# Patient Record
Sex: FEMALE | Race: WHITE | NOT HISPANIC OR LATINO | Employment: FULL TIME | URBAN - METROPOLITAN AREA
[De-identification: names, ages, dates, MRNs, and addresses within clinical notes are randomized per-mention and may not be internally consistent; named-entity substitution may affect disease eponyms.]

---

## 2017-03-03 PROBLEM — C44.90 SKIN CANCER: Status: ACTIVE | Noted: 2017-03-03

## 2017-12-12 ENCOUNTER — OFFICE VISIT (OUTPATIENT)
Dept: DERMATOLOGY | Facility: CLINIC | Age: 37
End: 2017-12-12
Payer: COMMERCIAL

## 2017-12-12 DIAGNOSIS — D22.9 MULTIPLE BENIGN NEVI: Primary | ICD-10-CM

## 2017-12-12 DIAGNOSIS — Z85.820 PERSONAL HISTORY OF MALIGNANT MELANOMA OF SKIN: ICD-10-CM

## 2017-12-12 DIAGNOSIS — Z85.828 PERSONAL HISTORY OF MALIGNANT NEOPLASM OF SKIN: ICD-10-CM

## 2017-12-12 DIAGNOSIS — L82.1 SEBORRHEIC KERATOSES: ICD-10-CM

## 2017-12-12 DIAGNOSIS — D18.00 ANGIOMA: ICD-10-CM

## 2017-12-12 DIAGNOSIS — L81.4 LENTIGINES: ICD-10-CM

## 2017-12-12 PROCEDURE — 99999 PR PBB SHADOW E&M-EST. PATIENT-LVL II: CPT | Mod: PBBFAC,,, | Performed by: DERMATOLOGY

## 2017-12-12 PROCEDURE — 99203 OFFICE O/P NEW LOW 30 MIN: CPT | Mod: S$GLB,,, | Performed by: DERMATOLOGY

## 2017-12-12 RX ORDER — GABAPENTIN 100 MG/1
CAPSULE ORAL
Refills: 1 | COMMUNITY
Start: 2017-11-16 | End: 2022-07-19

## 2017-12-12 NOTE — PROGRESS NOTES
Subjective:       Patient ID:  Tita Campos is a 37 y.o. female who presents for   Chief Complaint   Patient presents with    Skin Check     tbse     HPI  38 yo F presents for skin check.  She was due for a skin check last December but got busy with moving back home to [facility].  She has many spots on her body but they have remained stable in size, no bleeding.  She has had many biopsies in the past.   Derm Hx: multiple BCCs (4); melanoma of L shin; denies tanning bed use  Family Hx: father  due to melanoma  Past Medical History:   Diagnosis Date    Basal cell carcinoma     Cancer     skin cancer , basal cell CA     Cancer     cervical cancer    Melanoma      Review of Systems   Skin: Positive for daily sunscreen use and activity-related sunscreen use. Negative for recent sunburn.   Hematologic/Lymphatic: Does not bruise/bleed easily.        Objective:    Physical Exam   Constitutional: She appears well-developed and well-nourished. No distress.   Neurological: She is alert and oriented to person, place, and time. She is not disoriented.   Psychiatric: She has a normal mood and affect.   Skin:   Areas Examined (abnormalities noted in diagram):   Scalp / Hair Palpated and Inspected  Head / Face Inspection Performed  Neck Inspection Performed  Chest / Axilla Inspection Performed  Abdomen Inspection Performed  Genitals / Buttocks / Groin Inspection Performed  Back Inspection Performed  RUE Inspected  LUE Inspection Performed  RLE Inspected  LLE Inspection Performed  Nails and Digits Inspection Performed                   Diagram Legend     Erythematous scaling macule/papule c/w actinic keratosis       Vascular papule c/w angioma      Pigmented verrucoid papule/plaque c/w seborrheic keratosis      Yellow umbilicated papule c/w sebaceous hyperplasia      Irregularly shaped tan macule c/w lentigo     1-2 mm smooth white papules consistent with Milia      Movable subcutaneous cyst with punctum c/w  epidermal inclusion cyst      Subcutaneous movable cyst c/w pilar cyst      Firm pink to brown papule c/w dermatofibroma      Pedunculated fleshy papule(s) c/w skin tag(s)      Evenly pigmented macule c/w junctional nevus     Mildly variegated pigmented, slightly irregular-bordered macule c/w mildly atypical nevus      Flesh colored to evenly pigmented papule c/w intradermal nevus       Pink pearly papule/plaque c/w basal cell carcinoma      Erythematous hyperkeratotic cursted plaque c/w SCC      Surgical scar with no sign of skin cancer recurrence      Open and closed comedones      Inflammatory papules and pustules      Verrucoid papule consistent consistent with wart     Erythematous eczematous patches and plaques     Dystrophic onycholytic nail with subungual debris c/w onychomycosis     Umbilicated papule    Erythematous-base heme-crusted tan verrucoid plaque consistent with inflamed seborrheic keratosis     Erythematous Silvery Scaling Plaque c/w Psoriasis     See annotation      Assessment / Plan:        Multiple benign nevi  Reassurance that her nevi appear benign with regular and consistent pigment pattern on dermoscopy    Seborrheic keratoses  These are benign inherited growths without a malignant potential. Reassurance given to patient. No treatment is necessary.     Angioma  This is a benign vascular lesion. Reassurance given. No treatment required.     Lentigines  These are benign hyperpigmented sun induced lesions. Daily sun protection will reduce the number of new lesions    Personal history of malignant neoplasm of skin/Personal history of malignant melanoma of skin  Area of previous melanoma and NMSCs examined. Sites well healed with no signs of recurrence.  Total body skin examination performed today including at least 12 points as noted in physical examination. No lesions suspicious for malignancy noted.           Return in about 6 months (around 6/12/2018).

## 2018-03-06 ENCOUNTER — CLINICAL SUPPORT (OUTPATIENT)
Dept: SMOKING CESSATION | Facility: CLINIC | Age: 38
End: 2018-03-06
Payer: COMMERCIAL

## 2018-03-06 DIAGNOSIS — F17.200 NICOTINE DEPENDENCE: Primary | ICD-10-CM

## 2018-03-06 PROCEDURE — 99404 PREV MED CNSL INDIV APPRX 60: CPT | Mod: S$GLB,,, | Performed by: FAMILY MEDICINE

## 2018-03-06 RX ORDER — IBUPROFEN 200 MG
1 TABLET ORAL DAILY
Qty: 14 PATCH | Refills: 0 | Status: SHIPPED | OUTPATIENT
Start: 2018-03-06 | End: 2018-05-31 | Stop reason: SDUPTHER

## 2018-03-06 RX ORDER — VARENICLINE TARTRATE 0.5 (11)-1
KIT ORAL
Qty: 1 PACKAGE | Refills: 0 | Status: SHIPPED | OUTPATIENT
Start: 2018-03-06 | End: 2018-04-05 | Stop reason: ALTCHOICE

## 2018-03-06 NOTE — Clinical Note
Pt seen at intake today. She currently smokes 30 cigs/day. Discussed tobacco cessation medication of chantix starter pack and 21 mg nicotine patch QD. Pt started on rate reduction and wait time of 15 min prior to smoking. Exhaled carbon monoxide level was 27 (0-6 non-smoker). Will see pt back in office in 1 wk.

## 2018-03-15 ENCOUNTER — CLINICAL SUPPORT (OUTPATIENT)
Dept: SMOKING CESSATION | Facility: CLINIC | Age: 38
End: 2018-03-15
Payer: COMMERCIAL

## 2018-03-15 DIAGNOSIS — F17.200 NICOTINE DEPENDENCE: ICD-10-CM

## 2018-03-15 PROCEDURE — 99402 PREV MED CNSL INDIV APPRX 30: CPT | Mod: S$GLB,,, | Performed by: FAMILY MEDICINE

## 2018-03-15 NOTE — PROGRESS NOTES
Individual Follow-Up Form    3/15/2018    Clinical Status of Patient: Outpatient    Length of Service: 30 minutes    Continuing Medication: yes  Chantix or Patches    Other Medications: none     Target Symptoms: Withdrawal and medication side effects. The following were rated moderate (3) to severe (4) on TCRS:  · Moderate (3): desire/crave tobacco  · Severe (4): none    Comments:  Pt seen in office today. She continues to smoke 7 cigs/day. Pt remains on tobacco cessation medication of chantix 1 mg BID and 21 mg nicotine patch QD. She states that cravings are down. Doing well with rate reduction. No adverse effects/mental changes noted at this time. Pt asked to reduce current smoking rate by 2 cigs/day. Reviewed coping strategies/habitual behavior with patient. Exhaled carbon monoxide level was 13 ppm per Smokerlyzer (0-6 non-smoker). Will see pt back in office in 1 wk.     Diagnosis: F17.200    Next Visit: 1 week

## 2018-03-15 NOTE — Clinical Note
Pt seen in office today. She continues to smoke 7 cigs/day. Pt remains on tobacco cessation medication of chantix 1 mg BID and 21 mg nicotine patch QD. She states that cravings are down. Doing well with rate reduction. No adverse effects/mental changes noted at this time. Pt asked to reduce current smoking rate by 2 cigs/day. Reviewed coping strategies/habitual behavior with patient. Exhaled carbon monoxide level was 13 ppm per Smokerlyzer (0-6 non-smoker). Will see pt back in office in 1 wk.

## 2018-03-19 DIAGNOSIS — F17.200 NICOTINE DEPENDENCE: ICD-10-CM

## 2018-03-19 RX ORDER — IBUPROFEN 200 MG
1 TABLET ORAL DAILY
Qty: 14 PATCH | Refills: 0 | Status: CANCELLED | OUTPATIENT
Start: 2018-03-19 | End: 2018-04-02

## 2018-03-22 ENCOUNTER — CLINICAL SUPPORT (OUTPATIENT)
Dept: SMOKING CESSATION | Facility: CLINIC | Age: 38
End: 2018-03-22
Payer: COMMERCIAL

## 2018-03-22 DIAGNOSIS — F17.200 NICOTINE DEPENDENCE: ICD-10-CM

## 2018-03-22 PROCEDURE — 99402 PREV MED CNSL INDIV APPRX 30: CPT | Mod: S$GLB,,, | Performed by: FAMILY MEDICINE

## 2018-03-22 NOTE — PROGRESS NOTES
Individual Follow-Up Form    3/22/2018    Clinical Status of Patient: Outpatient    Length of Service: 30 minutes    Continuing Medication: yes  Chantix    Other Medications: none     Target Symptoms: Withdrawal and medication side effects. The following were rated moderate (3) to severe (4) on TCRS:  · Moderate (3): none  · Severe (4): none    Comments:  Pt seen in office today. She continues to smoke 15 cigs/day. Pt remains on tobacco cessation medication of chantix 1 mg BID. No adverse effects/mental changes noted at this time. Pt asked to reduce current smoking rate by 3 cigs/day. Reviewed coping strategies/habitual behavior/relapse prevention with patient. Exhaled carbon monoxide level was 21 ppm per Smokerlyzer (0-6 non-smoker). She states that she smoked prior to visit. Will see pt back in office in 1 wk.     Diagnosis: F17.200    Next Visit: 1 week

## 2018-03-22 NOTE — Clinical Note
Pt seen in office today. She continues to smoke 15 cigs/day. Pt remains on tobacco cessation medication of chantix 1 mg BID. No adverse effects/mental changes noted at this time. Pt asked to reduce current smoking rate by 3 cigs/day. Reviewed coping strategies/habitual behavior/relapse prevention with patient. Exhaled carbon monoxide level was 21 ppm per Smokerlyzer (0-6 non-smoker). She states that she smoked prior to visit. Will see pt back in office in 1 wk.

## 2018-03-29 ENCOUNTER — CLINICAL SUPPORT (OUTPATIENT)
Dept: SMOKING CESSATION | Facility: CLINIC | Age: 38
End: 2018-03-29
Payer: COMMERCIAL

## 2018-03-29 DIAGNOSIS — F17.200 NICOTINE DEPENDENCE: Primary | ICD-10-CM

## 2018-03-29 PROCEDURE — 99402 PREV MED CNSL INDIV APPRX 30: CPT | Mod: S$GLB,,, | Performed by: FAMILY MEDICINE

## 2018-03-29 RX ORDER — IBUPROFEN 200 MG
1 TABLET ORAL DAILY
Qty: 28 PATCH | Refills: 0 | Status: SHIPPED | OUTPATIENT
Start: 2018-03-29 | End: 2018-04-23 | Stop reason: SDUPTHER

## 2018-03-29 NOTE — PROGRESS NOTES
Individual Follow-Up Form    3/29/2018    Clinical Status of Patient: Outpatient    Length of Service: 30 minutes    Continuing Medication: yes  Chantix    Other Medications: none     Target Symptoms: Withdrawal and medication side effects. The following were rated moderate (3) to severe (4) on TCRS:  · Moderate (3): desire/crave tobacco  · Severe (4): none    Comments:  Pt seen in office today. She continues to smoke 12 cigs/day. Pt remains on tobacco cessation medication of chantix starter pack. Added 21 mg nicotine patch QD to help with cravings. No adverse effects/mental changes noted at this time. Pt asked to reduce current smoking rate by 3 cigs/day. Reviewed coping strategies/habitual behavior with patient. Exhaled carbon monoxide level was 14 ppm per Smokerlyzer (0-6 non-smoker). Will see pt back in office in 1 wk.     Diagnosis: F17.200    Next Visit: 1 week

## 2018-03-29 NOTE — Clinical Note
Pt seen in office today. She continues to smoke 12 cigs/day. Pt remains on tobacco cessation medication of chantix starter pack. Added 21 mg nicotine patch QD to help with cravings. No adverse effects/mental changes noted at this time. Pt asked to reduce current smoking rate by 3 cigs/day. Reviewed coping strategies/habitual behavior with patient. Exhaled carbon monoxide level was 14 ppm per Smokerlyzer (0-6 non-smoker). Will see pt back in office in 1 wk.

## 2018-04-05 DIAGNOSIS — F17.200 NICOTINE DEPENDENCE: Primary | ICD-10-CM

## 2018-04-05 RX ORDER — VARENICLINE TARTRATE 1 MG/1
1 TABLET, FILM COATED ORAL 2 TIMES DAILY
Qty: 60 TABLET | Refills: 0 | Status: SHIPPED | OUTPATIENT
Start: 2018-04-05 | End: 2018-05-10 | Stop reason: SDUPTHER

## 2018-04-05 NOTE — PROGRESS NOTES
Pt called to say she is sick and could not make appointment today. She wanted to re-schedule, but no openings at the time she requests. She will wait until next appointment on Thursday. Re-ordered chantix at this time.

## 2018-04-12 ENCOUNTER — CLINICAL SUPPORT (OUTPATIENT)
Dept: SMOKING CESSATION | Facility: CLINIC | Age: 38
End: 2018-04-12
Payer: COMMERCIAL

## 2018-04-12 DIAGNOSIS — F17.200 NICOTINE DEPENDENCE: ICD-10-CM

## 2018-04-12 PROCEDURE — 99402 PREV MED CNSL INDIV APPRX 30: CPT | Mod: S$GLB,,, | Performed by: FAMILY MEDICINE

## 2018-04-12 NOTE — PROGRESS NOTES
Individual Follow-Up Form    4/12/2018    Clinical Status of Patient: Outpatient    Length of Service: 30 minutes    Continuing Medication: yes  Chantix or Patches    Other Medications: none     Target Symptoms: Withdrawal and medication side effects. The following were rated moderate (3) to severe (4) on TCRS:  · Moderate (3): desire/crave tobacco  · Severe (4): none    Comments:  Pt seen in office today. She continues to smoke 9 cigs/day. Pt remains on tobacco cessation medication of chantix 1 mg BID and 21 mg nicotine patch QD. No adverse effects/mental changes noted at this time. Pt asked to reduce current smoking rate by 3 cigs/day. Reviewed coping strategies/habitual behavior/relapse prevention with patient. Exhaled carbon monoxide level was 4 ppm per Smokerlyzer (0-6 non-smoker). Will see pt back in office in 1 wk.     Diagnosis: F17.200    Next Visit: 1 week

## 2018-04-12 NOTE — Clinical Note
Pt seen in office today. She continues to smoke 9 cigs/day. Pt remains on tobacco cessation medication of chantix 1 mg BID and 21 mg nicotine patch QD. No adverse effects/mental changes noted at this time. Pt asked to reduce current smoking rate by 3 cigs/day. Reviewed coping strategies/habitual behavior/relapse prevention with patient. Exhaled carbon monoxide level was 4 ppm per Smokerlyzer (0-6 non-smoker). Will see pt back in office in 1 wk.

## 2018-04-19 ENCOUNTER — CLINICAL SUPPORT (OUTPATIENT)
Dept: SMOKING CESSATION | Facility: CLINIC | Age: 38
End: 2018-04-19
Payer: COMMERCIAL

## 2018-04-19 DIAGNOSIS — F17.200 NICOTINE DEPENDENCE: ICD-10-CM

## 2018-04-19 PROCEDURE — 99402 PREV MED CNSL INDIV APPRX 30: CPT | Mod: S$GLB,,, | Performed by: FAMILY MEDICINE

## 2018-04-19 RX ORDER — IBUPROFEN 200 MG
1 TABLET ORAL DAILY
Qty: 28 PATCH | Refills: 0 | Status: CANCELLED | OUTPATIENT
Start: 2018-04-19 | End: 2018-05-19

## 2018-04-19 NOTE — PROGRESS NOTES
Individual Follow-Up Form    4/19/2018    Clinical Status of Patient: Outpatient    Length of Service: 30 minutes    Continuing Medication: yes  Chantix or Patches    Other Medications: none     Target Symptoms: Withdrawal and medication side effects. The following were rated moderate (3) to severe (4) on TCRS:  · Moderate (3): desire/crave tobacco  · Severe (4): none    Comments: Pt seen in office today. She continues to smoke 3-4 cigs/day. Pt remains on tobacco cessation medication of chantix 1 mg BID and 21 mg nicotine patch QD. No adverse effects/mental changes noted at this time. Pt asked her to make this her last pack and attempt a quit prior to next visit. Reviewed coping strategies/habitual behavior/relapse prevention with patient. Exhaled carbon monoxide level was 4 ppm per Smokerlyzer (0-6 non-smoker). Will see pt back in office in 1 wk.     Diagnosis: F17.200    Next Visit: 1 week

## 2018-04-19 NOTE — Clinical Note
Pt seen in office today. She continues to smoke 3-4 cigs/day. Pt remains on tobacco cessation medication of chantix 1 mg BID and 21 mg nicotine patch QD. No adverse effects/mental changes noted at this time. Pt asked her to make this her last pack and attempt a quit prior to next visit. Reviewed coping strategies/habitual behavior/relapse prevention with patient. Exhaled carbon monoxide level was 4 ppm per Smokerlyzer (0-6 non-smoker). Will see pt back in office in 1 wk.

## 2018-04-23 DIAGNOSIS — G44.001 INTRACTABLE CLUSTER HEADACHE SYNDROME, UNSPECIFIED CHRONICITY PATTERN: ICD-10-CM

## 2018-04-23 DIAGNOSIS — F17.200 NICOTINE DEPENDENCE: ICD-10-CM

## 2018-04-23 RX ORDER — IBUPROFEN 200 MG
1 TABLET ORAL DAILY
Qty: 28 PATCH | Refills: 0 | Status: SHIPPED | OUTPATIENT
Start: 2018-04-23 | End: 2018-09-10 | Stop reason: SDUPTHER

## 2018-04-26 ENCOUNTER — CLINICAL SUPPORT (OUTPATIENT)
Dept: SMOKING CESSATION | Facility: CLINIC | Age: 38
End: 2018-04-26
Payer: COMMERCIAL

## 2018-04-26 DIAGNOSIS — F17.200 NICOTINE DEPENDENCE: ICD-10-CM

## 2018-04-26 PROCEDURE — 99402 PREV MED CNSL INDIV APPRX 30: CPT | Mod: S$GLB,,, | Performed by: FAMILY MEDICINE

## 2018-04-26 NOTE — PROGRESS NOTES
Individual Follow-Up Form    4/26/2018    Clinical Status of Patient: Outpatient    Length of Service: 30 minutes    Continuing Medication: yes  Chantix or Patches    Other Medications: none     Target Symptoms: Withdrawal and medication side effects. The following were rated moderate (3) to severe (4) on TCRS:  · Moderate (3): desire/crave tobacco  · Severe (4): none    Comments: Pt seen in office today. She has been tobacco free for 3 days now. Pt remains on tobacco cessation medication of chantix 1 mg BID and 21 mg nicotine patch QD. No adverse effects/mental changes noted at this time. She states that the cravings for a cigarette remain high in am. Congratulated her on her success and encouraged her to keep up the great work. Reviewed coping strategies/habitual behavior/relapse prevention with patient. Exhaled carbon monoxide level was 1 ppm per Smokerlyzer (0-6 non-smoker). Will see pt back in office in 1 wk.     Diagnosis: F17.200    Next Visit: 1 week

## 2018-04-26 NOTE — Clinical Note
Pt seen in office today. She has been tobacco free for 3 days now. Pt remains on tobacco cessation medication of chantix 1 mg BID and 21 mg nicotine patch QD. No adverse effects/mental changes noted at this time. She states that the cravings for a cigarette remain high in am. Congratulated her on her success and encouraged her to keep up the great work. Reviewed coping strategies/habitual behavior/relapse prevention with patient. Exhaled carbon monoxide level was 1 ppm per Smokerlyzer (0-6 non-smoker). Will see pt back in office in 1 wk.

## 2018-05-03 ENCOUNTER — CLINICAL SUPPORT (OUTPATIENT)
Dept: SMOKING CESSATION | Facility: CLINIC | Age: 38
End: 2018-05-03
Payer: COMMERCIAL

## 2018-05-03 DIAGNOSIS — F17.200 NICOTINE DEPENDENCE: ICD-10-CM

## 2018-05-03 PROCEDURE — 99402 PREV MED CNSL INDIV APPRX 30: CPT | Mod: S$GLB,,, | Performed by: FAMILY MEDICINE

## 2018-05-03 NOTE — PROGRESS NOTES
Individual Follow-Up Form    5/3/2018    Clinical Status of Patient: Outpatient    Length of Service: 30 minutes    Continuing Medication: yes  Chantix, Patches or Nicotine gum    Other Medications: none     Target Symptoms: Withdrawal and medication side effects. The following were rated moderate (3) to severe (4) on TCRS:  · Moderate (3): desire/crave tobacco  · Severe (4): none    Comments:  Pt seen in office today. She remains tobacco free at this time, but does admit to one slip last week. She came early to appointment and I was able to see her. Pt remains on tobacco cessation medication of chantix 1 mg BID and 21 mg nicotine patch QD. Cravings for nicotine remain, but are significantly reduced. She continues to work on stress issues and smoking. No adverse effects/mental changes noted at this time. Reviewed coping strategies/habitual behavior/relapse prevention with patient. Exhaled carbon monoxide level was 3 ppm per Smokerlyzer (0-6 non-smoker). Will see pt back in office in 1 wk.     Diagnosis: F17.200    Next Visit: 1 week

## 2018-05-03 NOTE — Clinical Note
Pt seen in office today. She remains tobacco free at this time, but does admit to one slip last week. She came early to appointment and I was able to see her. Pt remains on tobacco cessation medication of chantix 1 mg BID and 21 mg nicotine patch QD. Cravings for nicotine remain, but are significantly reduced. She continues to work on stress issues and smoking. No adverse effects/mental changes noted at this time. Reviewed coping strategies/habitual behavior/relapse prevention with patient. Exhaled carbon monoxide level was 3 ppm per Smokerlyzer (0-6 non-smoker). Will see pt back in office in 1 wk.

## 2018-05-10 ENCOUNTER — CLINICAL SUPPORT (OUTPATIENT)
Dept: SMOKING CESSATION | Facility: CLINIC | Age: 38
End: 2018-05-10
Payer: COMMERCIAL

## 2018-05-10 DIAGNOSIS — F17.200 NICOTINE DEPENDENCE: ICD-10-CM

## 2018-05-10 DIAGNOSIS — Z71.6 ENCOUNTER FOR TOBACCO USE CESSATION COUNSELING: ICD-10-CM

## 2018-05-10 PROCEDURE — 99402 PREV MED CNSL INDIV APPRX 30: CPT | Mod: S$GLB,,, | Performed by: FAMILY MEDICINE

## 2018-05-10 RX ORDER — VARENICLINE TARTRATE 1 MG/1
1 TABLET, FILM COATED ORAL 2 TIMES DAILY
Qty: 60 TABLET | Refills: 0 | Status: SHIPPED | OUTPATIENT
Start: 2018-05-10 | End: 2018-06-09

## 2018-05-10 NOTE — Clinical Note
Pt seen in office today. She remains tobacco free, but does admit to having slipped. Encouraged her to keep up the great work. Pt remains on tobacco cessation medication of chantix 1 mg BID and 21 mg nicotine patch QD. No adverse effects/mental changes noted at this time. Reviewed coping strategies/habitual behavior/relapse prevention with patient. Exhaled carbon monoxide level was 3 ppm per Smokerlyzer (0-6 non-smoker). She has completed all assigned tobacco cessation visits and will return PRN.

## 2018-05-10 NOTE — PROGRESS NOTES
Individual Follow-Up Form    5/10/2018    Clinical Status of Patient: Outpatient    Length of Service: 30 minutes    Continuing Medication: yes  Chantix or Patches    Other Medications: none     Target Symptoms: Withdrawal and medication side effects. The following were rated moderate (3) to severe (4) on TCRS:  · Moderate (3): none  · Severe (4): none    Comments:  Pt seen in office today. She remains tobacco free, but does admit to having slipped. Encouraged her to keep up the great work. Pt remains on tobacco cessation medication of chantix 1 mg BID and 21 mg nicotine patch QD. No adverse effects/mental changes noted at this time. Reviewed coping strategies/habitual behavior/relapse prevention with patient. Exhaled carbon monoxide level was 3 ppm per Smokerlyzer (0-6 non-smoker). She has completed all assigned tobacco cessation visits and will return PRN.    Diagnosis: F17.200    Next Visit: PRN

## 2018-05-31 DIAGNOSIS — F17.200 NICOTINE DEPENDENCE: ICD-10-CM

## 2018-05-31 RX ORDER — IBUPROFEN 200 MG
1 TABLET ORAL DAILY
Qty: 14 PATCH | Refills: 0 | Status: SHIPPED | OUTPATIENT
Start: 2018-05-31 | End: 2018-11-21 | Stop reason: SDUPTHER

## 2018-06-13 ENCOUNTER — CLINICAL SUPPORT (OUTPATIENT)
Dept: OCCUPATIONAL MEDICINE | Facility: CLINIC | Age: 38
End: 2018-06-13

## 2018-06-13 DIAGNOSIS — Z02.83 ENCOUNTER FOR DRUG SCREENING: Primary | ICD-10-CM

## 2018-06-13 LAB
CTP QC/QA: YES
POC 10 PANEL DRUG SCREEN: NEGATIVE

## 2018-06-13 PROCEDURE — 80305 DRUG TEST PRSMV DIR OPT OBS: CPT | Mod: QW,S$GLB,, | Performed by: INTERNAL MEDICINE

## 2018-07-25 ENCOUNTER — CLINICAL SUPPORT (OUTPATIENT)
Dept: SMOKING CESSATION | Facility: CLINIC | Age: 38
End: 2018-07-25
Payer: COMMERCIAL

## 2018-07-25 DIAGNOSIS — F17.200 NICOTINE DEPENDENCE: Primary | ICD-10-CM

## 2018-07-25 PROCEDURE — 99404 PREV MED CNSL INDIV APPRX 60: CPT | Mod: S$GLB,,, | Performed by: FAMILY MEDICINE

## 2018-07-25 RX ORDER — VARENICLINE TARTRATE 0.5 (11)-1
KIT ORAL
Qty: 1 PACKAGE | Refills: 0 | Status: SHIPPED | OUTPATIENT
Start: 2018-07-25 | End: 2018-08-01 | Stop reason: SDUPTHER

## 2018-07-25 RX ORDER — IBUPROFEN 200 MG
1 TABLET ORAL DAILY
Qty: 14 PATCH | Refills: 0 | Status: SHIPPED | OUTPATIENT
Start: 2018-07-25 | End: 2018-08-08

## 2018-07-25 NOTE — PROGRESS NOTES
See Tobacco Cessation Intake Form for patient assessment and recommendations.  Exhaled carbon monoxide level was 16 ppm per Smokerlyzer.

## 2018-07-25 NOTE — Clinical Note
Pt seen at intake today. She currently smokes 20 cigs/day. Discussed tobacco cessation medication of chantix starter pack and 14 mg nicotine patch QD. Pt started on rate reduction and wait time of 15 min prior to smoking. Exhaled carbon monoxide level was  16 (0-6 non-smoker). Will see pt back in office in 2 wks.

## 2018-08-01 DIAGNOSIS — F17.200 NICOTINE DEPENDENCE: ICD-10-CM

## 2018-08-01 RX ORDER — VARENICLINE TARTRATE 0.5 (11)-1
KIT ORAL
Qty: 1 PACKAGE | Refills: 0 | Status: SHIPPED | OUTPATIENT
Start: 2018-08-01 | End: 2018-08-03 | Stop reason: CLARIF

## 2018-08-03 RX ORDER — VARENICLINE TARTRATE 0.5 (11)-1
KIT ORAL
Qty: 53 TABLET | Refills: 0 | Status: SHIPPED | OUTPATIENT
Start: 2018-08-03 | End: 2018-08-20 | Stop reason: RX

## 2018-08-13 ENCOUNTER — CLINICAL SUPPORT (OUTPATIENT)
Dept: SMOKING CESSATION | Facility: CLINIC | Age: 38
End: 2018-08-13
Payer: COMMERCIAL

## 2018-08-13 DIAGNOSIS — F17.200 NICOTINE DEPENDENCE: ICD-10-CM

## 2018-08-13 PROCEDURE — 99402 PREV MED CNSL INDIV APPRX 30: CPT | Mod: S$GLB,,, | Performed by: FAMILY MEDICINE

## 2018-08-13 NOTE — Clinical Note
Pt seen in office today. She continues to smoke 20 cigs/day. Pt remains on tobacco cessation medication of chantix starter pack (called pharmacy to see if PA was approved, stated declined already used benefit for this year) and 21 mg nicotine patch QD. No adverse effects/mental changes noted at this time. Pt asked to reduce current smoking rate by 3 cigs/day. Reviewed coping strategies/habitual behavior/relapse prevention with patient. Exhaled carbon monoxide level was 15 ppm per Smokerlyzer (0-6 non-smoker). Will see pt back in office in 1 wk.

## 2018-08-13 NOTE — PROGRESS NOTES
Individual Follow-Up Form    8/13/2018    Clinical Status of Patient: Outpatient    Length of Service: 30 minutes    Continuing Medication: yes  Patches    Other Medications: none     Target Symptoms: Withdrawal and medication side effects. The following were  rated moderate (3) to severe (4) on TCRS:  · Moderate (3): desire/crave tobacco  · Severe (4): none    Comments:  Pt seen in office today. She continues to smoke 20 cigs/day. Pt remains on tobacco cessation medication of chantix starter pack (called pharmacy to see if PA was approved, stated declined already used benefit for this year) and 21 mg nicotine patch QD. No adverse effects/mental changes noted at this time. Pt asked to reduce current smoking rate by 3 cigs/day. Reviewed coping strategies/habitual behavior/relapse prevention with patient. Exhaled carbon monoxide level was 15 ppm per Smokerlyzer (0-6 non-smoker). Will see pt back in office in 1 wk.     Diagnosis: F17.200    Next Visit: 1 week

## 2018-08-20 ENCOUNTER — CLINICAL SUPPORT (OUTPATIENT)
Dept: SMOKING CESSATION | Facility: CLINIC | Age: 38
End: 2018-08-20
Payer: COMMERCIAL

## 2018-08-20 DIAGNOSIS — F17.200 NICOTINE DEPENDENCE: ICD-10-CM

## 2018-08-20 PROCEDURE — 99402 PREV MED CNSL INDIV APPRX 30: CPT | Mod: S$GLB,,, | Performed by: FAMILY MEDICINE

## 2018-08-20 RX ORDER — BUPROPION HYDROCHLORIDE 150 MG/1
150 TABLET, EXTENDED RELEASE ORAL 2 TIMES DAILY
Qty: 60 TABLET | Refills: 0 | Status: SHIPPED | OUTPATIENT
Start: 2018-08-20 | End: 2018-10-16 | Stop reason: SDUPTHER

## 2018-08-20 NOTE — Clinical Note
Pt seen in office today. She continues to smoke 20 cigs/day. Pt remains on tobacco cessation medication of 21 mg nicotine patch QD. Ordered zyban 150 mg QD. Chantix benefit used up according to [employer]. No adverse effects/mental changes noted at this time. Pt asked to reduce current smoking rate by 5 cigs/day. Reviewed coping strategies/habitual behavior/relapse prevention with patient. Exhaled carbon monoxide level was 23 ppm per Smokerlyzer (0-6 non-smoker). Will see pt back in office in 1 wk.

## 2018-08-20 NOTE — PROGRESS NOTES
Individual Follow-Up Form    8/20/2018    Clinical Status of Patient: Outpatient    Length of Service: 30 minutes    Continuing Medication: yes  Patches    Other Medications:  none     Target Symptoms: Withdrawal and medication side effects. The following were  rated moderate (3) to severe (4) on TCRS:  · Moderate (3): desire/crave tobacco  · Severe (4): none    Comments:  Pt seen in office today. She continues to smoke 20 cigs/day. Pt remains on tobacco cessation medication of 21 mg nicotine patch QD. Ordered zyban 150 mg QD. Chantix benefit used up according to [employer]. No adverse effects/mental changes noted at this time. Pt asked to reduce current smoking rate by 5 cigs/day. Reviewed coping strategies/habitual behavior/relapse prevention with patient. Exhaled carbon monoxide level was 23 ppm per Smokerlyzer (0-6 non-smoker). Will see pt back in office in 1 wk.     Diagnosis: F17.200    Next Visit: 1 week

## 2018-08-27 ENCOUNTER — TELEPHONE (OUTPATIENT)
Dept: SMOKING CESSATION | Facility: CLINIC | Age: 38
End: 2018-08-27

## 2018-08-27 NOTE — TELEPHONE ENCOUNTER
Pt missed appointment today. Called to check up on her progress with tobacco cessation. No answer, left message to call back at [phone]. Reminded her of next weeks appointment on Monday 9/101/8 at 1600.

## 2018-09-10 ENCOUNTER — CLINICAL SUPPORT (OUTPATIENT)
Dept: SMOKING CESSATION | Facility: CLINIC | Age: 38
End: 2018-09-10
Payer: COMMERCIAL

## 2018-09-10 ENCOUNTER — TELEPHONE (OUTPATIENT)
Dept: SMOKING CESSATION | Facility: CLINIC | Age: 38
End: 2018-09-10

## 2018-09-10 DIAGNOSIS — F17.200 NICOTINE DEPENDENCE: ICD-10-CM

## 2018-09-10 PROCEDURE — 99402 PREV MED CNSL INDIV APPRX 30: CPT | Mod: S$GLB,,, | Performed by: FAMILY MEDICINE

## 2018-09-10 RX ORDER — IBUPROFEN 200 MG
1 TABLET ORAL DAILY
Qty: 28 PATCH | Refills: 0 | Status: SHIPPED | OUTPATIENT
Start: 2018-09-10 | End: 2018-10-16 | Stop reason: SDUPTHER

## 2018-09-10 NOTE — TELEPHONE ENCOUNTER
Pt missed appointment today. Called to check up on her progress with tobacco cessation. Left message to call. Have not been in touch with her since 8/13 will drop if she does not call back.

## 2018-09-10 NOTE — Clinical Note
Pt seen in office today. She continues to smoke 21 cigs/day. Pt remains on tobacco cessation medication of zyban 150 mg QD and 21 mg nicotine patch QD. Craving for tobacco remain. Discussed increasing zyban to 150 BID. She agrees to try. No adverse effects/mental changes noted at this time. Pt asked to reduce current smoking rate by 3 cigs/day. Reviewed coping strategies/habitual behavior/relapse prevention with patient. Exhaled carbon monoxide level was 28 ppm per Smokerlyzer (0-6 non-smoker). Will see pt back in office in 1 wk.

## 2018-09-10 NOTE — PROGRESS NOTES
Individual Follow-Up Form    9/10/2018    Clinical Status of Patient: Outpatient    Length of Service: 30 minutes    Continuing Medication: yes  Wellbutrin or Patches    Other Medications: none     Target Symptoms: Withdrawal and medication side effects. The following were  rated moderate (3) to severe (4) on TCRS:  · Moderate (3): desire/crave tobacco  · Severe (4): none    Comments:  Pt seen in office today. She continues to smoke 21 cigs/day. Pt remains on tobacco cessation medication of zyban 150 mg QD and 21 mg nicotine patch QD. Craving for tobacco remain. Discussed increasing zyban to 150 BID. She agrees to try. No adverse effects/mental changes noted at this time. Pt asked to reduce current smoking rate by 3 cigs/day. Reviewed coping strategies/habitual behavior/relapse prevention with patient. Exhaled carbon monoxide level was 28 ppm per Smokerlyzer (0-6 non-smoker). Will see pt back in office in 1 wk.     Diagnosis: F17.200    Next Visit: 1 week

## 2018-09-17 ENCOUNTER — TELEPHONE (OUTPATIENT)
Dept: SMOKING CESSATION | Facility: CLINIC | Age: 38
End: 2018-09-17

## 2018-09-17 NOTE — TELEPHONE ENCOUNTER
Pt was no show for 1600 appointment today. Called to check up on her progress with tobacco cessation. No answer. Left message to call back if she needs anything. Reminded of her appointment on the 24th at 1600.

## 2018-09-24 ENCOUNTER — TELEPHONE (OUTPATIENT)
Dept: SMOKING CESSATION | Facility: CLINIC | Age: 38
End: 2018-09-24

## 2018-09-24 NOTE — TELEPHONE ENCOUNTER
Pt missed appointment with tobacco cessation today. Third phone call w/o response. Will drop for now, unless I hear back from her.

## 2018-10-16 ENCOUNTER — CLINICAL SUPPORT (OUTPATIENT)
Dept: SMOKING CESSATION | Facility: CLINIC | Age: 38
End: 2018-10-16
Payer: COMMERCIAL

## 2018-10-16 DIAGNOSIS — F17.200 NICOTINE DEPENDENCE: ICD-10-CM

## 2018-10-16 PROCEDURE — 99402 PREV MED CNSL INDIV APPRX 30: CPT | Mod: S$GLB,,, | Performed by: FAMILY MEDICINE

## 2018-10-16 RX ORDER — BUPROPION HYDROCHLORIDE 150 MG/1
150 TABLET, EXTENDED RELEASE ORAL 2 TIMES DAILY
Qty: 60 TABLET | Refills: 0 | Status: SHIPPED | OUTPATIENT
Start: 2018-10-16 | End: 2018-11-21 | Stop reason: SDUPTHER

## 2018-10-16 RX ORDER — IBUPROFEN 200 MG
1 TABLET ORAL DAILY
Qty: 28 PATCH | Refills: 0 | Status: SHIPPED | OUTPATIENT
Start: 2018-10-16 | End: 2018-11-15

## 2018-10-16 NOTE — PROGRESS NOTES
Individual Follow-Up Form    10/16/2018    Clinical Status of Patient: Outpatient    Length of Service: 30 minutes    Continuing Medication: yes  Wellbutrin or Patches    Other Medications: none     Target Symptoms: Withdrawal and medication side effects. The following were  rated moderate (3) to severe (4) on TCRS:  · Moderate (3): none  · Severe (4): desire/crave tobacco    Comments:  Pt seen in office today. She continues to smoke 20 cigs/day. Pt remains on tobacco cessation medication of zyban 150 mg BID and 21 mg nicotine patch QD. Pt continues to C/O of nicotine cravings. She will send W2 and insurance card and I will sent to [facility] to try to get chantix at no cost. No adverse effects/mental changes noted at this time. Pt asked to reduce current smoking rate by 3 cigs/day. Reviewed coping strategies/habitual behavior/relapse prevention with patient. Exhaled carbon monoxide level was 21 ppm per Smokerlyzer (0-6 non-smoker). Will see pt back in office in 1 wk.     Diagnosis: F17.200    Next Visit: 1 week

## 2018-10-16 NOTE — Clinical Note
Pt seen in office today. She continues to smoke 20 cigs/day. Pt remains on tobacco cessation medication of zyban 150 mg BID and 21 mg nicotine patch QD. Pt continues to C/O of nicotine cravings. She will send W2 and insurance card and I will sent to [facility] to try to get chantix at no cost. No adverse effects/mental changes noted at this time. Pt asked to reduce current smoking rate by 3 cigs/day. Reviewed coping strategies/habitual behavior/relapse prevention with patient. Exhaled carbon monoxide level was 21 ppm per Smokerlyzer (0-6 non-smoker). Will see pt back in office in 1 wk.

## 2018-10-30 ENCOUNTER — CLINICAL SUPPORT (OUTPATIENT)
Dept: SMOKING CESSATION | Facility: CLINIC | Age: 38
End: 2018-10-30
Payer: COMMERCIAL

## 2018-10-30 DIAGNOSIS — F17.200 NICOTINE DEPENDENCE: ICD-10-CM

## 2018-10-30 PROCEDURE — 99402 PREV MED CNSL INDIV APPRX 30: CPT | Mod: S$GLB,,, | Performed by: FAMILY MEDICINE

## 2018-10-30 NOTE — PROGRESS NOTES
Individual Follow-Up Form    10/30/2018    Clinical Status of Patient: Outpatient    Length of Service: 30 minutes    Continuing Medication: yes  Patches    Other Medications: none     Target Symptoms: Withdrawal and medication side effects. The following were  rated moderate (3) to severe (4) on TCRS:  · Moderate (3): none  · Severe (4): desire/crave tobacco    Comments:  Pt seen in office today. She continues to smoke 20 cigs/day. Pt remains on tobacco cessation medication of zyban 150 mg BID and 21 mg nicotine patch QD. She has stopped using zyban, she feels like it was ineffective. Sent paper work to [facility] to see about chantix. She used before with great results. No adverse effects/mental changes noted at this time. Pt asked to reduce current smoking rate by 5 cigs/day. Reviewed coping strategies/habitual behavior/relapse prevention with patient. Exhaled carbon monoxide level was 37 ppm per Smokerlyzer (0-6 non-smoker). Will see pt back in office in 1 wk.     Diagnosis: F17.200    Next Visit: 1 week

## 2018-10-30 NOTE — Clinical Note
Pt seen in office today. She continues to smoke 20 cigs/day. Pt remains on tobacco cessation medication of zyban 150 mg BID and 21 mg nicotine patch QD. She has stopped using zyban, she feels like it was ineffective. Sent paper work to [facility] to see about chantix. She used before with great results. No adverse effects/mental changes noted at this time. Pt asked to reduce current smoking rate by 5 cigs/day. Reviewed coping strategies/habitual behavior/relapse prevention with patient. Exhaled carbon monoxide level was 37 ppm per Smokerlyzer (0-6 non-smoker). Will see pt back in office in 1 wk.

## 2018-11-06 ENCOUNTER — DOCUMENTATION ONLY (OUTPATIENT)
Dept: SMOKING CESSATION | Facility: CLINIC | Age: 38
End: 2018-11-06

## 2018-11-06 NOTE — PROGRESS NOTES
Emailed patient regarding re-schedule of tomorrow's appointment. Re-scheduled for next Wednesday 11/14 at 1600. Asked to let me know if that time still works.

## 2018-11-20 ENCOUNTER — TELEPHONE (OUTPATIENT)
Dept: SMOKING CESSATION | Facility: CLINIC | Age: 38
End: 2018-11-20

## 2018-11-21 ENCOUNTER — CLINICAL SUPPORT (OUTPATIENT)
Dept: SMOKING CESSATION | Facility: CLINIC | Age: 38
End: 2018-11-21
Payer: COMMERCIAL

## 2018-11-21 DIAGNOSIS — F17.200 NICOTINE DEPENDENCE: ICD-10-CM

## 2018-11-21 PROCEDURE — 99402 PREV MED CNSL INDIV APPRX 30: CPT | Mod: S$GLB,,, | Performed by: FAMILY MEDICINE

## 2018-11-21 RX ORDER — BUPROPION HYDROCHLORIDE 150 MG/1
150 TABLET, EXTENDED RELEASE ORAL 2 TIMES DAILY
Qty: 60 TABLET | Refills: 0 | Status: SHIPPED | OUTPATIENT
Start: 2018-11-21 | End: 2019-11-01

## 2018-11-21 RX ORDER — IBUPROFEN 200 MG
1 TABLET ORAL DAILY
Qty: 28 PATCH | Refills: 0 | Status: SHIPPED | OUTPATIENT
Start: 2018-11-21 | End: 2018-12-21

## 2018-11-21 NOTE — PROGRESS NOTES
Individual Follow-Up Form    11/21/2018    Clinical Status of Patient: Outpatient    Length of Service: 30 minutes    Continuing Medication: yes  Wellbutrin or Patches    Other Medications: none     Target Symptoms: Withdrawal and medication side effects. The following were rated moderate (3) to severe (4) on TCRS:  · Moderate (3): desire/crave tobacco  · Severe (4): none    Comments:  Pt seen in office today. She continues to smoke 15 cigs/day. Pt remains on tobacco cessation medication of chantix starter pack/1 mg BID and 21 mg nicotine patch QD. No adverse effects/mental changes noted at this time. Reviewed coping strategies/habitual behavior/relapse prevention with patient. Exhaled carbon monoxide level was 32 ppm per Smokerlyzer (0-6 non-smoker). Pt has completed all assigned tobacco cessation visits. She states she will continue with attempt to quit. She will reapply for benefits next time she is eligible.     Diagnosis: F17.200    Next Visit: PRN

## 2018-11-21 NOTE — Clinical Note
Pt seen in office today. She continues to smoke 15 cigs/day. Pt remains on tobacco cessation medication of chantix starter pack/1 mg BID and 21 mg nicotine patch QD. No adverse effects/mental changes noted at this time. Reviewed coping strategies/habitual behavior/relapse prevention with patient. Exhaled carbon monoxide level was 32 ppm per Smokerlyzer (0-6 non-smoker). Pt has completed all assigned tobacco cessation visits. She states she will continue with attempt to quit. She will reapply for benefits next time she is eligible.

## 2018-12-18 RX ORDER — BUPROPION HYDROCHLORIDE 150 MG/1
TABLET, EXTENDED RELEASE ORAL
Qty: 60 TABLET | Refills: 0 | OUTPATIENT
Start: 2018-12-18

## 2019-03-11 ENCOUNTER — TELEPHONE (OUTPATIENT)
Dept: OBSTETRICS AND GYNECOLOGY | Facility: CLINIC | Age: 39
End: 2019-03-11

## 2019-03-11 DIAGNOSIS — Z30.42 FAMILY PLANNING, DEPO-PROVERA CONTRACEPTION MONITORING/ADMINISTRATION: ICD-10-CM

## 2019-03-11 RX ORDER — MEDROXYPROGESTERONE ACETATE 150 MG/ML
150 INJECTION, SUSPENSION INTRAMUSCULAR
Qty: 1 ML | Refills: 3 | Status: SHIPPED | OUTPATIENT
Start: 2019-03-11 | End: 2019-11-01 | Stop reason: SDUPTHER

## 2019-03-11 NOTE — TELEPHONE ENCOUNTER
Spoke with pt. Pt notified medication sent to pharmacy. Depo injection appt scheduled. Pt verbalized understanding.

## 2019-03-11 NOTE — TELEPHONE ENCOUNTER
----- Message from Lina Saldaña sent at 3/11/2019  8:55 AM CDT -----  Contact: pt   Name of Who is Calling: MANJINDER ARMIJO [[id number]]    What is the request in detail:Patient is requesting a call back in regards to having depo medication called in when she comes for her appointment on 04/18/19..... Please contact to further discuss and advise      Can the clinic reply by [facility]: Yes     [facility] [phone].

## 2019-03-11 NOTE — TELEPHONE ENCOUNTER
Spoke with pt. Pt has an appt with you on April 18th, but is requesting to get a Depo injection before then. Pt states she has been off of it for a year but would like to start the injection again. Please advise.

## 2019-03-12 ENCOUNTER — CLINICAL SUPPORT (OUTPATIENT)
Dept: OBSTETRICS AND GYNECOLOGY | Facility: CLINIC | Age: 39
End: 2019-03-12
Payer: COMMERCIAL

## 2019-03-12 DIAGNOSIS — Z30.42 ON DEPO-PROVERA FOR CONTRACEPTION: Primary | ICD-10-CM

## 2019-03-12 LAB
B-HCG UR QL: NEGATIVE
CTP QC/QA: YES

## 2019-03-12 PROCEDURE — 96372 PR INJECTION,THERAP/PROPH/DIAG2ST, IM OR SUBCUT: ICD-10-PCS | Mod: S$GLB,,, | Performed by: OBSTETRICS & GYNECOLOGY

## 2019-03-12 PROCEDURE — 99999 PR PBB SHADOW E&M-EST. PATIENT-LVL II: ICD-10-PCS | Mod: PBBFAC,,,

## 2019-03-12 PROCEDURE — 96372 THER/PROPH/DIAG INJ SC/IM: CPT | Mod: S$GLB,,, | Performed by: OBSTETRICS & GYNECOLOGY

## 2019-03-12 PROCEDURE — 81025 POCT URINE PREGNANCY: ICD-10-PCS | Mod: S$GLB,,, | Performed by: OBSTETRICS & GYNECOLOGY

## 2019-03-12 PROCEDURE — 99999 PR PBB SHADOW E&M-EST. PATIENT-LVL II: CPT | Mod: PBBFAC,,,

## 2019-03-12 PROCEDURE — 81025 URINE PREGNANCY TEST: CPT | Mod: S$GLB,,, | Performed by: OBSTETRICS & GYNECOLOGY

## 2019-03-12 RX ORDER — MEDROXYPROGESTERONE ACETATE 150 MG/ML
150 INJECTION, SUSPENSION INTRAMUSCULAR
Status: SHIPPED | OUTPATIENT
Start: 2019-03-12

## 2019-03-12 RX ADMIN — MEDROXYPROGESTERONE ACETATE 150 MG: 150 INJECTION, SUSPENSION INTRAMUSCULAR at 03:03

## 2019-03-12 NOTE — PROGRESS NOTES
Pt here for depo provera injection. Pt arrived out of range. POCT urine pregnancy test obtained with Negative results. Injection given in Left Gluteus at 3:52pm. No complaints of pain before or after injection. Advised to wait 5 minutes and report any adverse reactions. Return between May 28th-June 11 for next injection. Pt verbalized understanding.   PATIENT SUPPLIED MEDICATION.  See medication Card for RX information  Order verified, inspected package,storage verified,expiration verified

## 2019-04-11 ENCOUNTER — TELEPHONE (OUTPATIENT)
Dept: SMOKING CESSATION | Facility: CLINIC | Age: 39
End: 2019-04-11

## 2019-04-12 ENCOUNTER — CLINICAL SUPPORT (OUTPATIENT)
Dept: SMOKING CESSATION | Facility: CLINIC | Age: 39
End: 2019-04-12
Payer: COMMERCIAL

## 2019-04-12 DIAGNOSIS — F17.200 NICOTINE DEPENDENCE: Primary | ICD-10-CM

## 2019-04-12 PROCEDURE — 99407 PR TOBACCO USE CESSATION INTENSIVE >10 MINUTES: ICD-10-PCS | Mod: S$GLB,,, | Performed by: INTERNAL MEDICINE

## 2019-04-12 PROCEDURE — 99407 BEHAV CHNG SMOKING > 10 MIN: CPT | Mod: S$GLB,,, | Performed by: INTERNAL MEDICINE

## 2019-04-12 NOTE — PROGRESS NOTES
Spoke with patient today in regard to smoking cessation progress for 1 year telephone follow up, she states not tobacco free. Patient has scheduled an appointment to return to the program for Quit attempt #2 on 5/6/2019 @ 5:00 pm. Informed patient of benefit period, future follow ups, and contact information if any further help or support is needed. Will resolve episode and complete smart form for Quit attempt #1.

## 2019-04-18 ENCOUNTER — OFFICE VISIT (OUTPATIENT)
Dept: OBSTETRICS AND GYNECOLOGY | Facility: CLINIC | Age: 39
End: 2019-04-18
Payer: COMMERCIAL

## 2019-04-18 VITALS
WEIGHT: 137.81 LBS | HEIGHT: 61 IN | BODY MASS INDEX: 26.02 KG/M2 | SYSTOLIC BLOOD PRESSURE: 92 MMHG | DIASTOLIC BLOOD PRESSURE: 62 MMHG

## 2019-04-18 DIAGNOSIS — Z01.419 WELL WOMAN EXAM WITH ROUTINE GYNECOLOGICAL EXAM: Primary | ICD-10-CM

## 2019-04-18 DIAGNOSIS — Z12.39 ENCOUNTER FOR SCREENING FOR MALIGNANT NEOPLASM OF BREAST: ICD-10-CM

## 2019-04-18 PROCEDURE — 99999 PR PBB SHADOW E&M-EST. PATIENT-LVL III: ICD-10-PCS | Mod: PBBFAC,,, | Performed by: OBSTETRICS & GYNECOLOGY

## 2019-04-18 PROCEDURE — 87624 HPV HI-RISK TYP POOLED RSLT: CPT

## 2019-04-18 PROCEDURE — 88175 CYTOPATH C/V AUTO FLUID REDO: CPT | Performed by: PATHOLOGY

## 2019-04-18 PROCEDURE — 99999 PR PBB SHADOW E&M-EST. PATIENT-LVL III: CPT | Mod: PBBFAC,,, | Performed by: OBSTETRICS & GYNECOLOGY

## 2019-04-18 PROCEDURE — 99386 PR PREVENTIVE VISIT,NEW,40-64: ICD-10-PCS | Mod: S$GLB,,, | Performed by: OBSTETRICS & GYNECOLOGY

## 2019-04-18 PROCEDURE — 88141 LIQUID-BASED PAP SMEAR, SCREENING: ICD-10-PCS | Mod: ,,, | Performed by: PATHOLOGY

## 2019-04-18 PROCEDURE — 88141 CYTOPATH C/V INTERPRET: CPT | Mod: ,,, | Performed by: PATHOLOGY

## 2019-04-18 PROCEDURE — 99386 PREV VISIT NEW AGE 40-64: CPT | Mod: S$GLB,,, | Performed by: OBSTETRICS & GYNECOLOGY

## 2019-04-18 RX ORDER — IBUPROFEN 800 MG/1
TABLET ORAL
Refills: 1 | COMMUNITY
Start: 2019-01-11 | End: 2019-11-01

## 2019-04-18 NOTE — PROGRESS NOTES
History & Physical  Gynecology      SUBJECTIVE:     Chief Complaint: Gynecologic Exam       History of Present Illness:  Ms. Campos is a 39 yr old female who presents for annual, well woman exam. Complaints: None. Currently on Depo for contraception. + hx abnormal pap with LEEP. Two subsequent normal paps. Last pap was 2018. Currently sexually active. No hx of STIs. + fam hx of breast cancer (sister). Denies fam hx of ovarian or colon cancer. Feels safe at home, wears seatbelts, exercises frequently.        Review of patient's allergies indicates:  No Known Allergies    Past Medical History:   Diagnosis Date    Basal cell carcinoma     Cancer     skin cancer , basal cell CA     Cancer     cervical cancer    Fibrocystic breast     Melanoma      Past Surgical History:   Procedure Laterality Date    BREAST BIOPSY      BREAST SURGERY      benign nodules right breast     CERVICAL BIOPSY  W/ LOOP ELECTRODE EXCISION      SKIN CANCER EXCISION      WRIST SURGERY Left 2017    several small soft tissues masses     OB History        6    Para   3    Term                AB   3    Living           SAB   3    TAB        Ectopic        Multiple        Live Births                   Family History   Problem Relation Age of Onset    Cancer Mother         breast     Breast cancer Mother     Cancer Father         melanoma    Melanoma Father     Cancer Paternal Grandmother     Cancer Paternal Grandfather     Cancer Sister         breast    Breast cancer Sister     Colon cancer Neg Hx     Ovarian cancer Neg Hx      Social History     Tobacco Use    Smoking status: Current Every Day Smoker     Packs/day: 0.40     Types: Cigarettes    Smokeless tobacco: Never Used    Tobacco comment: pt on chantix   Substance Use Topics    Alcohol use: No    Drug use: No       Current Outpatient Medications   Medication Sig    gabapentin (NEURONTIN) 100 MG capsule TK ONE C PO  QD UTD    ibuprofen  (ADVIL,MOTRIN) 800 MG tablet TK 1 T PO BID PRN WF OR MILK    medroxyPROGESTERone (DEPO-PROVERA) 150 mg/mL Syrg Inject 1 mL (150 mg total) into the muscle every 3 (three) months.    naproxen (NAPROSYN) 500 MG tablet Take 1 tablet (500 mg total) by mouth 2 (two) times daily with meals.    naproxen-esomeprazole (VIMOVO) 500-20 mg TbID Take by mouth.    traMADol (ULTRAM) 50 mg tablet Take 1 tablet (50 mg total) by mouth every 4 (four) hours as needed.    buPROPion (WELLBUTRIN SR) 150 MG TBSR 12 hr tablet Take 1 tablet (150 mg total) by mouth 2 (two) times daily.     Current Facility-Administered Medications   Medication    medroxyPROGESTERone (DEPO-PROVERA) injection 150 mg         Review of Systems:  Review of Systems   Constitutional: Negative for activity change, fatigue, fever and unexpected weight change.   Respiratory: Negative for cough and shortness of breath.    Cardiovascular: Negative for chest pain and palpitations.   Gastrointestinal: Negative for abdominal pain, constipation, diarrhea and nausea.   Endocrine: Negative for hot flashes.   Genitourinary: Negative for dyspareunia, dysuria, menorrhagia, menstrual problem, pelvic pain and vaginal discharge.   Musculoskeletal: Negative for back pain.   Integumentary:  Negative for nipple discharge.   Neurological: Negative for headaches.   Psychiatric/Behavioral: The patient is not nervous/anxious.    Breast: Negative for nipple discharge       OBJECTIVE:     Physical Exam:  Physical Exam   Constitutional: She is oriented to person, place, and time. She appears well-developed and well-nourished.   HENT:   Head: Normocephalic and atraumatic.   Neck: Normal range of motion. Neck supple.   Cardiovascular: Normal rate, regular rhythm, normal heart sounds and intact distal pulses.   Pulmonary/Chest: Effort normal and breath sounds normal. Right breast exhibits no inverted nipple, no mass, no nipple discharge, no skin change and no tenderness. Left breast  exhibits no inverted nipple, no mass, no nipple discharge, no skin change and no tenderness.   Abdominal: Soft. Bowel sounds are normal. There is no tenderness. There is no guarding.   Genitourinary: There is no rash, tenderness, lesion or injury on the right labia. There is no rash, tenderness, lesion or injury on the left labia. Uterus is not deviated, not enlarged, not fixed and not tender. Cervix exhibits no motion tenderness, no discharge and no friability. Right adnexum displays no mass, no tenderness and no fullness. Left adnexum displays no mass, no tenderness and no fullness. No erythema, tenderness or bleeding in the vagina. No foreign body in the vagina. No signs of injury around the vagina. No vaginal discharge found.   Neurological: She is alert and oriented to person, place, and time.   Skin: Skin is warm and dry.   Psychiatric: She has a normal mood and affect.   Vitals reviewed.        ASSESSMENT:       ICD-10-CM ICD-9-CM    1. Well woman exam with routine gynecological exam Z01.419 V72.31 Liquid-based pap smear, screening      HPV High Risk Genotypes, PCR   2. Encounter for screening for malignant neoplasm of breast Z12.31 V76.10 Mammo Digital Screening Bilat          Plan:      Annual, well woman, pap/cotesting done today. +hx of abnormal paps with LEEP. Two subsequent normal paps  Mammogram ordered and scheduled  No GYN complaints  Continue Depo for contraception  RTC in 1 yr for annual exam or PRN    Counseling time: 15 minutes    Elio Iglesias

## 2019-04-24 LAB
HPV HR 12 DNA CVX QL NAA+PROBE: POSITIVE
HPV16 AG SPEC QL: NEGATIVE
HPV18 DNA SPEC QL NAA+PROBE: NEGATIVE

## 2019-04-29 ENCOUNTER — TELEPHONE (OUTPATIENT)
Dept: OBSTETRICS AND GYNECOLOGY | Facility: CLINIC | Age: 39
End: 2019-04-29

## 2019-04-29 NOTE — TELEPHONE ENCOUNTER
Pt notified of results. Pt stated she would call back to schedule her repeat pap smear as soon as she can take a day off work. No further questions or concerns.

## 2019-04-29 NOTE — TELEPHONE ENCOUNTER
----- Message from Elio Iglesias MD sent at 4/29/2019  8:42 AM CDT -----  Unsatisfactory pap. Please schedule patient for repeat pap.

## 2019-05-08 ENCOUNTER — OFFICE VISIT (OUTPATIENT)
Dept: OBSTETRICS AND GYNECOLOGY | Facility: CLINIC | Age: 39
End: 2019-05-08
Payer: COMMERCIAL

## 2019-05-08 ENCOUNTER — TELEPHONE (OUTPATIENT)
Dept: OBSTETRICS AND GYNECOLOGY | Facility: CLINIC | Age: 39
End: 2019-05-08

## 2019-05-08 VITALS
SYSTOLIC BLOOD PRESSURE: 108 MMHG | BODY MASS INDEX: 25.59 KG/M2 | DIASTOLIC BLOOD PRESSURE: 68 MMHG | WEIGHT: 135.56 LBS | HEIGHT: 61 IN

## 2019-05-08 DIAGNOSIS — R87.615 UNSATISFACTORY CERVICAL PAPANICOLAOU SMEAR: Primary | ICD-10-CM

## 2019-05-08 PROCEDURE — 3008F PR BODY MASS INDEX (BMI) DOCUMENTED: ICD-10-PCS | Mod: CPTII,S$GLB,, | Performed by: OBSTETRICS & GYNECOLOGY

## 2019-05-08 PROCEDURE — 99999 PR PBB SHADOW E&M-EST. PATIENT-LVL III: CPT | Mod: PBBFAC,,, | Performed by: OBSTETRICS & GYNECOLOGY

## 2019-05-08 PROCEDURE — 99999 PR PBB SHADOW E&M-EST. PATIENT-LVL III: ICD-10-PCS | Mod: PBBFAC,,, | Performed by: OBSTETRICS & GYNECOLOGY

## 2019-05-08 PROCEDURE — 88141 CYTOPATH C/V INTERPRET: CPT | Mod: ,,, | Performed by: PATHOLOGY

## 2019-05-08 PROCEDURE — 3008F BODY MASS INDEX DOCD: CPT | Mod: CPTII,S$GLB,, | Performed by: OBSTETRICS & GYNECOLOGY

## 2019-05-08 PROCEDURE — 87624 HPV HI-RISK TYP POOLED RSLT: CPT

## 2019-05-08 PROCEDURE — 99212 PR OFFICE/OUTPT VISIT, EST, LEVL II, 10-19 MIN: ICD-10-PCS | Mod: S$GLB,,, | Performed by: OBSTETRICS & GYNECOLOGY

## 2019-05-08 PROCEDURE — 88175 CYTOPATH C/V AUTO FLUID REDO: CPT | Performed by: PATHOLOGY

## 2019-05-08 PROCEDURE — 88141 LIQUID-BASED PAP SMEAR, SCREENING: ICD-10-PCS | Mod: ,,, | Performed by: PATHOLOGY

## 2019-05-08 PROCEDURE — 99212 OFFICE O/P EST SF 10 MIN: CPT | Mod: S$GLB,,, | Performed by: OBSTETRICS & GYNECOLOGY

## 2019-05-08 NOTE — TELEPHONE ENCOUNTER
----- Message from Kalpana Bellamy sent at 5/8/2019 10:45 AM CDT -----  Contact: MANJINDER ARMIJO   Name of Who is Calling: MANJINDER ARMIJO       What is the request in detail: Patient is requesting a doctor slip be faxed to [phone] she was seen this morning in the office      Can the clinic reply by [facility]: no      What Number to Call Back if not in [facility]: [phone]

## 2019-05-08 NOTE — PROGRESS NOTES
History & Physical  Gynecology      SUBJECTIVE:     Chief Complaint: repeat pap       History of Present Illness:  Tita is a 39 yr old female who presents for repeat pap 2/2 unsatisfactory pap      Review of patient's allergies indicates:  No Known Allergies    Past Medical History:   Diagnosis Date    Basal cell carcinoma     Cancer     skin cancer , basal cell CA     Cancer     cervical cancer    Fibrocystic breast     Melanoma      Past Surgical History:   Procedure Laterality Date    BREAST BIOPSY      BREAST SURGERY      benign nodules right breast     CERVICAL BIOPSY  W/ LOOP ELECTRODE EXCISION      SKIN CANCER EXCISION      WRIST SURGERY Left 2017    several small soft tissues masses     OB History        6    Para   3    Term                AB   3    Living           SAB   3    TAB        Ectopic        Multiple        Live Births                   Family History   Problem Relation Age of Onset    Cancer Mother         breast     Breast cancer Mother     Cancer Father         melanoma    Melanoma Father     Cancer Paternal Grandmother     Cancer Paternal Grandfather     Cancer Sister         breast    Breast cancer Sister     Colon cancer Neg Hx     Ovarian cancer Neg Hx      Social History     Tobacco Use    Smoking status: Current Every Day Smoker     Packs/day: 0.40     Types: Cigarettes    Smokeless tobacco: Never Used    Tobacco comment: pt on chantix   Substance Use Topics    Alcohol use: No    Drug use: No       Current Outpatient Medications   Medication Sig    gabapentin (NEURONTIN) 100 MG capsule TK ONE C PO  QD UTD    medroxyPROGESTERone (DEPO-PROVERA) 150 mg/mL Syrg Inject 1 mL (150 mg total) into the muscle every 3 (three) months.    buPROPion (WELLBUTRIN SR) 150 MG TBSR 12 hr tablet Take 1 tablet (150 mg total) by mouth 2 (two) times daily.    hydrOXYzine pamoate (VISTARIL) 25 MG Cap Take 1 capsule (25 mg total) by mouth every 6 (six) hours  as needed (itching).    ibuprofen (ADVIL,MOTRIN) 800 MG tablet TK 1 T PO BID PRN WF OR MILK    triamcinolone acetonide 0.1% (KENALOG) 0.1 % ointment Apply topically 2 (two) times daily.     Current Facility-Administered Medications   Medication    medroxyPROGESTERone (DEPO-PROVERA) injection 150 mg         Review of Systems:  Review of Systems   Constitutional: Negative for activity change, fatigue, fever and unexpected weight change.   Respiratory: Negative for cough and shortness of breath.    Cardiovascular: Negative for chest pain and palpitations.   Gastrointestinal: Negative for abdominal pain, constipation, diarrhea and nausea.   Endocrine: Negative for hot flashes.   Genitourinary: Negative for dyspareunia, dysuria, menorrhagia, menstrual problem, pelvic pain and vaginal discharge.   Musculoskeletal: Negative for back pain.   Integumentary:  Negative for nipple discharge.   Neurological: Negative for headaches.   Psychiatric/Behavioral: The patient is not nervous/anxious.    Breast: Negative for nipple discharge       OBJECTIVE:     Physical Exam:  Physical Exam   Constitutional: She is oriented to person, place, and time. She appears well-developed and well-nourished.   HENT:   Head: Normocephalic and atraumatic.   Neck: Normal range of motion. Neck supple.   Cardiovascular: Normal rate, regular rhythm, normal heart sounds and intact distal pulses.   Pulmonary/Chest: Effort normal and breath sounds normal.   Abdominal: Soft. Bowel sounds are normal. There is no tenderness. There is no guarding.   Genitourinary: There is no rash, tenderness, lesion or injury on the right labia. There is no rash, tenderness, lesion or injury on the left labia. Uterus is not deviated, not enlarged, not fixed and not tender. Cervix exhibits no motion tenderness, no discharge and no friability. Right adnexum displays no mass, no tenderness and no fullness. Left adnexum displays no mass, no tenderness and no fullness. No  erythema, tenderness or bleeding in the vagina. No foreign body in the vagina. No signs of injury around the vagina. No vaginal discharge found.   Neurological: She is alert and oriented to person, place, and time.   Skin: Skin is warm and dry.   Psychiatric: She has a normal mood and affect.   Vitals reviewed.        ASSESSMENT:       ICD-10-CM ICD-9-CM    1. Unsatisfactory cervical Papanicolaou smear R87.615 795.08 Liquid-based pap smear, screening          Plan:      Repeat pap done    Counseling time: 15 minutes    Elio Iglesias

## 2019-05-15 ENCOUNTER — TELEPHONE (OUTPATIENT)
Dept: OBSTETRICS AND GYNECOLOGY | Facility: CLINIC | Age: 39
End: 2019-05-15

## 2019-05-15 NOTE — TELEPHONE ENCOUNTER
----- Message from Elio Iglesias MD sent at 5/15/2019  9:19 AM CDT -----  ASCUS pap with + HR HPV. Please call and schedule for colpo. [facility] message sent

## 2019-05-16 ENCOUNTER — TELEPHONE (OUTPATIENT)
Dept: OBSTETRICS AND GYNECOLOGY | Facility: CLINIC | Age: 39
End: 2019-05-16

## 2019-05-17 LAB
HPV HR 12 DNA CVX QL NAA+PROBE: NEGATIVE
HPV16 AG SPEC QL: NEGATIVE
HPV18 DNA SPEC QL NAA+PROBE: NEGATIVE

## 2019-05-29 ENCOUNTER — CLINICAL SUPPORT (OUTPATIENT)
Dept: OBSTETRICS AND GYNECOLOGY | Facility: CLINIC | Age: 39
End: 2019-05-29
Payer: COMMERCIAL

## 2019-05-29 DIAGNOSIS — Z30.42 ON DEPO-PROVERA FOR CONTRACEPTION: Primary | ICD-10-CM

## 2019-05-29 PROCEDURE — 99999 PR PBB SHADOW E&M-EST. PATIENT-LVL II: CPT | Mod: PBBFAC,,,

## 2019-05-29 PROCEDURE — 96372 THER/PROPH/DIAG INJ SC/IM: CPT | Mod: S$GLB,,, | Performed by: OBSTETRICS & GYNECOLOGY

## 2019-05-29 PROCEDURE — 96372 PR INJECTION,THERAP/PROPH/DIAG2ST, IM OR SUBCUT: ICD-10-PCS | Mod: S$GLB,,, | Performed by: OBSTETRICS & GYNECOLOGY

## 2019-05-29 PROCEDURE — 99999 PR PBB SHADOW E&M-EST. PATIENT-LVL II: ICD-10-PCS | Mod: PBBFAC,,,

## 2019-05-29 RX ADMIN — MEDROXYPROGESTERONE ACETATE 150 MG: 150 INJECTION, SUSPENSION INTRAMUSCULAR at 01:05

## 2019-05-29 NOTE — PROGRESS NOTES
Pt here for Depo Provera Injection. Pt in time frame. Injection given in Right Gluteus at 1:42pm. Patient with no current complaints of pain prior to or after injection. Advised to wait 5 minutes and report any adverse reactions. Return between August 14th-August 28th for next injection. Pt verbalized understanding.   PATIENT SUPPLIED MEDICATION.  See medication Card for RX information  Order verified, inspected package,storage verified,expiration verified

## 2019-06-03 ENCOUNTER — PROCEDURE VISIT (OUTPATIENT)
Dept: OBSTETRICS AND GYNECOLOGY | Facility: CLINIC | Age: 39
End: 2019-06-03
Payer: COMMERCIAL

## 2019-06-03 VITALS
DIASTOLIC BLOOD PRESSURE: 62 MMHG | SYSTOLIC BLOOD PRESSURE: 100 MMHG | HEIGHT: 61 IN | WEIGHT: 133.38 LBS | BODY MASS INDEX: 25.18 KG/M2

## 2019-06-03 DIAGNOSIS — R87.610 ATYPICAL SQUAMOUS CELL CHANGES OF UNDETERMINED SIGNIFICANCE (ASCUS) ON CERVICAL CYTOLOGY WITH POSITIVE HIGH RISK HUMAN PAPILLOMA VIRUS (HPV): Primary | ICD-10-CM

## 2019-06-03 DIAGNOSIS — R87.810 ATYPICAL SQUAMOUS CELL CHANGES OF UNDETERMINED SIGNIFICANCE (ASCUS) ON CERVICAL CYTOLOGY WITH POSITIVE HIGH RISK HUMAN PAPILLOMA VIRUS (HPV): Primary | ICD-10-CM

## 2019-06-03 LAB
B-HCG UR QL: NEGATIVE
CTP QC/QA: YES

## 2019-06-03 PROCEDURE — 88305 TISSUE SPECIMEN TO PATHOLOGY, OBSTETRICS/GYNECOLOGY: ICD-10-PCS | Mod: 26,,, | Performed by: PATHOLOGY

## 2019-06-03 PROCEDURE — 57454 COLPOSCOPY W/BIOPSY AND ECC- TODAY: ICD-10-PCS | Mod: S$GLB,,, | Performed by: OBSTETRICS & GYNECOLOGY

## 2019-06-03 PROCEDURE — 57454 BX/CURETT OF CERVIX W/SCOPE: CPT | Mod: S$GLB,,, | Performed by: OBSTETRICS & GYNECOLOGY

## 2019-06-03 PROCEDURE — 81025 POCT URINE PREGNANCY: ICD-10-PCS | Mod: S$GLB,,, | Performed by: OBSTETRICS & GYNECOLOGY

## 2019-06-03 PROCEDURE — 88305 TISSUE EXAM BY PATHOLOGIST: CPT | Performed by: PATHOLOGY

## 2019-06-03 PROCEDURE — 88305 TISSUE EXAM BY PATHOLOGIST: CPT | Mod: 26,,, | Performed by: PATHOLOGY

## 2019-06-03 PROCEDURE — 81025 URINE PREGNANCY TEST: CPT | Mod: S$GLB,,, | Performed by: OBSTETRICS & GYNECOLOGY

## 2019-06-03 RX ORDER — VARENICLINE TARTRATE 0.5 (11)-1
KIT ORAL
Refills: 0 | COMMUNITY
Start: 2019-05-30 | End: 2019-06-24 | Stop reason: ALTCHOICE

## 2019-06-03 NOTE — PROCEDURES
Colposcopy W/BIOPSY AND ECC- Today  Date/Time: 6/3/2019 2:10 PM  Performed by: Elio Iglesias MD  Authorized by: Elio Iglesias MD   Preparation: Patient was prepped and draped in the usual sterile fashion.  Local anesthesia used: no    Anesthesia:  Local anesthesia used: no    Sedation:  Patient sedated: no    Patient tolerance: Patient tolerated the procedure well with no immediate complications      COLPOSCOPY:    Tita Campos is a 39 y.o. female   presents for colposcopy.  No LMP recorded. Patient has had an injection..  Her most recent pap smear shows ASCUS with POSITIVE high risk HPV.      The abnormal test findings were discussed, as well as HPV infection, need for colposcopy and possible biopsies to determine the plan of care, treatments available, the minimal risk of bleeding and infection with colposcopy, and alternatives to colposcopy and she agrees to proceed.      UPT is negative    COLPOSCOPY EXAM:   TIME OUT PERFORMED.     acetowhite lesion(s) noted at 3 o'clock    Biopsy was taken at 3 o'clock.  ECC was performed    Hemostasis was adequate with application of Monsel's solution.  The speculum was removed.  The patient did tolerate the procedure well.    All collected specimens sent to pathology for histologic analysis.    Post-colposcopy counseling:  The patient was instructed to manage post-colposcopy cramping with NSAIDs or Tylenol, or with a prescription per the medication card.  Avoid intercourse, douching, or tampons in the vagina for at least 2-3 days.  Expect a clumpy blackish discharge due to Monsel's solution application for several days.  Report heavy bleeding, worsening pain or pain that does not respond to above medications, or foul-smelling vaginal discharge. HPV vaccine recommended according to FDA age guidelines.  Importance of follow-up stressed.      Follow up based on colposcopy results.     no discharge, no irritation, no pain, no redness, and no visual changes.

## 2019-06-10 ENCOUNTER — CLINICAL SUPPORT (OUTPATIENT)
Dept: SMOKING CESSATION | Facility: CLINIC | Age: 39
End: 2019-06-10
Payer: COMMERCIAL

## 2019-06-10 DIAGNOSIS — F17.200 NICOTINE DEPENDENCE: ICD-10-CM

## 2019-06-10 PROCEDURE — 99404 PR PREVENT COUNSEL,INDIV,60 MIN: ICD-10-PCS | Mod: S$GLB,,, | Performed by: FAMILY MEDICINE

## 2019-06-10 PROCEDURE — 99404 PREV MED CNSL INDIV APPRX 60: CPT | Mod: S$GLB,,, | Performed by: FAMILY MEDICINE

## 2019-06-10 RX ORDER — IBUPROFEN 200 MG
1 TABLET ORAL DAILY
Qty: 14 PATCH | Refills: 0 | Status: SHIPPED | OUTPATIENT
Start: 2019-06-10 | End: 2019-06-24 | Stop reason: SDUPTHER

## 2019-06-10 NOTE — Clinical Note
Pt seen at intake today. She currently smokes 20 cigs/day. Discussed tobacco cessation medication of chantix starter pack (obtained from another provider) and 21 mg nicotine patch QD. Pt started on rate reduction and wait time of 15 min prior to smoking. Exhaled carbon monoxide level was 14 (0-6 non-smoker). Will see pt back in offic in 1 wk.

## 2019-06-10 NOTE — PROGRESS NOTES
See Tobacco Cessation Intake Form for patient assessment and recommendations.  Exhaled carbon monoxide level was 14 ppm per Smokerlyzer.

## 2019-06-17 ENCOUNTER — CLINICAL SUPPORT (OUTPATIENT)
Dept: SMOKING CESSATION | Facility: CLINIC | Age: 39
End: 2019-06-17
Payer: COMMERCIAL

## 2019-06-17 DIAGNOSIS — F17.200 NICOTINE DEPENDENCE: ICD-10-CM

## 2019-06-17 PROCEDURE — 99402 PREV MED CNSL INDIV APPRX 30: CPT | Mod: S$GLB,,, | Performed by: FAMILY MEDICINE

## 2019-06-17 PROCEDURE — 99402 PR PREVENT COUNSEL,INDIV,30 MIN: ICD-10-PCS | Mod: S$GLB,,, | Performed by: FAMILY MEDICINE

## 2019-06-17 NOTE — PROGRESS NOTES
Individual Follow-Up Form    6/17/2019    Clinical Status of Patient: Outpatient    Length of Service: 30 minutes    Continuing Medication: yes  Chantix or Patches    Other Medications: none     Target Symptoms: Withdrawal and medication side effects. The following were  rated moderate (3) to severe (4) on TCRS:  · Moderate (3): desire/crave tobacco  · Severe (4): none    Comments:  Pt seen in office today. She continues to smoke 15 cigs/day. Pt remains on tobacco cessation medication of chantix starter pack and 21 mg nicotine patch QD. Cravings are down for tobacco. No adverse effects/mental changes noted at this time. Pt asked to reduce current smoking rate by 3 cigs/day. Congratulated her on her success and encouraged her to keep up the great work. Reviewed coping strategies/habitual behavior/relapse prevention with patient. Exhaled carbon monoxide level was 8 ppm per Smokerlyzer (0-6 non-smoker). Will see pt back in office in 1 wk.     Diagnosis: F17.200    Next Visit: 1 week

## 2019-06-17 NOTE — Clinical Note
Pt seen in office today. She continues to smoke 15 cigs/day. Pt remains on tobacco cessation medication of chantix starter pack and 21 mg nicotine patch QD. Cravings are down for tobacco. No adverse effects/mental changes noted at this time. Pt asked to reduce current smoking rate by 3 cigs/day. Congratulated her on her success and encouraged her to keep up the great work. Reviewed coping strategies/habitual behavior/relapse prevention with patient. Exhaled carbon monoxide level was 8 ppm per Smokerlyzer (0-6 non-smoker). Will see pt back in office in 1 wk.

## 2019-06-24 ENCOUNTER — CLINICAL SUPPORT (OUTPATIENT)
Dept: SMOKING CESSATION | Facility: CLINIC | Age: 39
End: 2019-06-24
Payer: COMMERCIAL

## 2019-06-24 DIAGNOSIS — F17.200 NICOTINE DEPENDENCE: ICD-10-CM

## 2019-06-24 PROCEDURE — 99402 PR PREVENT COUNSEL,INDIV,30 MIN: ICD-10-PCS | Mod: S$GLB,,, | Performed by: FAMILY MEDICINE

## 2019-06-24 PROCEDURE — 99402 PREV MED CNSL INDIV APPRX 30: CPT | Mod: S$GLB,,, | Performed by: FAMILY MEDICINE

## 2019-06-24 RX ORDER — IBUPROFEN 200 MG
1 TABLET ORAL DAILY
Qty: 28 PATCH | Refills: 0 | Status: SHIPPED | OUTPATIENT
Start: 2019-06-24 | End: 2019-07-24

## 2019-06-24 RX ORDER — VARENICLINE TARTRATE 1 MG/1
1 TABLET, FILM COATED ORAL 2 TIMES DAILY
Qty: 60 TABLET | Refills: 0 | Status: SHIPPED | OUTPATIENT
Start: 2019-06-24 | End: 2019-07-29 | Stop reason: SDUPTHER

## 2019-06-24 NOTE — Clinical Note
Pt seen in office today. She continues to smoke 12 cigs/day. Pt remains on tobacco cessation medication of chantix 1 mg BID and 21 mg nicotine patch QD. No adverse effects/mental changes noted at this time. Pt asked to reduce current smoking rate by 3 cigs/day. Reviewed coping strategies/habitual behavior/relapse prevention with patient. Exhaled carbon monoxide level was 19 ppm per Smokerlyzer (0-6 non-smoker). Will see pt back in office in 1 wk.

## 2019-06-24 NOTE — PROGRESS NOTES
Individual Follow-Up Form    6/24/2019    Clinical Status of Patient: Outpatient    Length of Service: 30 minutes    Continuing Medication: yes  Chantix or Patches    Other Medications: none     Target Symptoms: Withdrawal and medication side effects. The following were  rated moderate (3) to severe (4) on TCRS:  · Moderate (3): desire/crave tobacco  · Severe (4): none    Comments:  Pt seen in office today. She continues to smoke 12 cigs/day. Pt remains on tobacco cessation medication of chantix 1 mg BID and 21 mg nicotine patch QD. No adverse effects/mental changes noted at this time. Pt asked to reduce current smoking rate by 3 cigs/day. Reviewed coping strategies/habitual behavior/relapse prevention with patient. Exhaled carbon monoxide level was 19 ppm per Smokerlyzer (0-6 non-smoker). Will see pt back in office in 1 wk.     Diagnosis: F17.200    Next Visit: 1 week

## 2019-07-15 ENCOUNTER — CLINICAL SUPPORT (OUTPATIENT)
Dept: SMOKING CESSATION | Facility: CLINIC | Age: 39
End: 2019-07-15
Payer: COMMERCIAL

## 2019-07-15 DIAGNOSIS — F17.200 NICOTINE DEPENDENCE: ICD-10-CM

## 2019-07-15 PROCEDURE — 99402 PREV MED CNSL INDIV APPRX 30: CPT | Mod: S$GLB,,, | Performed by: FAMILY MEDICINE

## 2019-07-15 PROCEDURE — 99402 PR PREVENT COUNSEL,INDIV,30 MIN: ICD-10-PCS | Mod: S$GLB,,, | Performed by: FAMILY MEDICINE
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
## 2019-07-29 ENCOUNTER — TELEPHONE (OUTPATIENT)
Dept: OBSTETRICS AND GYNECOLOGY | Facility: CLINIC | Age: 39
End: 2019-07-29

## 2019-07-29 ENCOUNTER — CLINICAL SUPPORT (OUTPATIENT)
Dept: SMOKING CESSATION | Facility: CLINIC | Age: 39
End: 2019-07-29
Payer: COMMERCIAL

## 2019-07-29 DIAGNOSIS — F17.200 NICOTINE DEPENDENCE: ICD-10-CM

## 2019-07-29 PROCEDURE — 99402 PREV MED CNSL INDIV APPRX 30: CPT | Mod: S$GLB,,, | Performed by: FAMILY MEDICINE

## 2019-07-29 PROCEDURE — 99402 PR PREVENT COUNSEL,INDIV,30 MIN: ICD-10-PCS | Mod: S$GLB,,, | Performed by: FAMILY MEDICINE

## 2019-07-29 RX ORDER — VARENICLINE TARTRATE 1 MG/1
1 TABLET, FILM COATED ORAL 2 TIMES DAILY
Qty: 60 TABLET | Refills: 0 | Status: SHIPPED | OUTPATIENT
Start: 2019-07-29 | End: 2019-08-28

## 2019-07-29 NOTE — PROGRESS NOTES
Individual Follow-Up Form    7/29/2019    Clinical Status of Patient: Outpatient    Length of Service: 30 minutes    Continuing Medication: yes  Chantix or Patches    Other Medications: none     Target Symptoms: Withdrawal and medication side effects. The following were  rated moderate (3) to severe (4) on TCRS:  · Moderate (3): desire/crave tobacco  · Severe (4): none    Comments: Pt seen in office today. She is currently tobacco free. Congratulated her on her success and encouraged her to keep up the great work. Pt remains on tobacco cessation medication of chantix 1 mg BID and 21 mg nicotine patch QD. She states she is not currently using nicotine patches. She states she feels they are not necessary. chantix is working for her. Reviewed coping strategies/habitual behavior/relapse prevention with patient. Exhaled carbon monoxide level was 6 ppm per Smokerlyzer (0-6 non-smoker). Will see pt back in office in 1 wk.     Diagnosis: F17.200    Next Visit: 1 week

## 2019-07-29 NOTE — Clinical Note
Pt seen in office today. She is currently tobacco free. Congratulated her on her success and encouraged her to keep up the great work. Pt remains on tobacco cessation medication of chantix 1 mg BID and 21 mg nicotine patch QD. She states she is not currently using nicotine patches. She states she feels they are not necessary. chantix is working for her. Reviewed coping strategies/habitual behavior/relapse prevention with patient. Exhaled carbon monoxide level was 6 ppm per Smokerlyzer (0-6 non-smoker). Will see pt back in office in 1 wk.

## 2019-07-29 NOTE — TELEPHONE ENCOUNTER
Spoke with pt. Pt states she has been bleeding for 2 weeks now. She states the Depo injection is not helping her. She would like to talk about surgical management with Dr Iglesias. appt scheduled. Pt verbalized understanding.

## 2019-07-29 NOTE — TELEPHONE ENCOUNTER
----- Message from Barbara Ricketts sent at 7/29/2019  2:38 PM CDT -----  Contact: Self            Name of Who is Calling:  MANJINDER ARMIJO [[id number]]      What is the request in detail: Pt states she has been bleeding for two weeks. Pt states this is an ongoing issue and the depo injections are not working. Pt would like to be advised on what she needs to do. Please contact to further discuss and advise.        Can the clinic reply by [facility]: N      What Number to Call Back if not in [facility]: [phone]

## 2019-08-05 ENCOUNTER — OFFICE VISIT (OUTPATIENT)
Dept: OBSTETRICS AND GYNECOLOGY | Facility: CLINIC | Age: 39
End: 2019-08-05
Payer: COMMERCIAL

## 2019-08-05 ENCOUNTER — CLINICAL SUPPORT (OUTPATIENT)
Dept: SMOKING CESSATION | Facility: CLINIC | Age: 39
End: 2019-08-05
Payer: COMMERCIAL

## 2019-08-05 VITALS
WEIGHT: 129.88 LBS | HEIGHT: 61 IN | SYSTOLIC BLOOD PRESSURE: 118 MMHG | DIASTOLIC BLOOD PRESSURE: 66 MMHG | BODY MASS INDEX: 24.52 KG/M2

## 2019-08-05 DIAGNOSIS — Z30.42 ON DEPO-PROVERA FOR CONTRACEPTION: Primary | ICD-10-CM

## 2019-08-05 DIAGNOSIS — F17.200 NICOTINE DEPENDENCE: ICD-10-CM

## 2019-08-05 PROCEDURE — 99999 PR PBB SHADOW E&M-EST. PATIENT-LVL III: ICD-10-PCS | Mod: PBBFAC,,, | Performed by: OBSTETRICS & GYNECOLOGY

## 2019-08-05 PROCEDURE — 99402 PR PREVENT COUNSEL,INDIV,30 MIN: ICD-10-PCS | Mod: S$GLB,,, | Performed by: FAMILY MEDICINE

## 2019-08-05 PROCEDURE — 99402 PREV MED CNSL INDIV APPRX 30: CPT | Mod: S$GLB,,, | Performed by: FAMILY MEDICINE

## 2019-08-05 PROCEDURE — 99213 PR OFFICE/OUTPT VISIT, EST, LEVL III, 20-29 MIN: ICD-10-PCS | Mod: S$GLB,,, | Performed by: OBSTETRICS & GYNECOLOGY

## 2019-08-05 PROCEDURE — 3008F PR BODY MASS INDEX (BMI) DOCUMENTED: ICD-10-PCS | Mod: CPTII,S$GLB,, | Performed by: OBSTETRICS & GYNECOLOGY

## 2019-08-05 PROCEDURE — 3008F BODY MASS INDEX DOCD: CPT | Mod: CPTII,S$GLB,, | Performed by: OBSTETRICS & GYNECOLOGY

## 2019-08-05 PROCEDURE — 99213 OFFICE O/P EST LOW 20 MIN: CPT | Mod: S$GLB,,, | Performed by: OBSTETRICS & GYNECOLOGY

## 2019-08-05 PROCEDURE — 99999 PR PBB SHADOW E&M-EST. PATIENT-LVL III: CPT | Mod: PBBFAC,,, | Performed by: OBSTETRICS & GYNECOLOGY

## 2019-08-05 RX ORDER — MEDROXYPROGESTERONE ACETATE 10 MG/1
10 TABLET ORAL DAILY
Qty: 30 TABLET | Refills: 1 | Status: SHIPPED | OUTPATIENT
Start: 2019-08-05 | End: 2020-06-01

## 2019-08-05 NOTE — PROGRESS NOTES
History & Physical  Gynecology      SUBJECTIVE:     Chief Complaint: Vaginal Bleeding       History of Present Illness:  Tita is a 39 yr old female who presents for vaginal bleeding. She is currently on depo. States that she starts bleeding two weeks before the injection is due but the injection does stop the bleeding once she gets it and she remains amenorrheic for 2.5 months. She desires to continue depo. Next injection is due in two weeks.      Review of patient's allergies indicates:  No Known Allergies    Past Medical History:   Diagnosis Date    Basal cell carcinoma     Cancer     skin cancer , basal cell CA     Cancer     cervical cancer    Fibrocystic breast     Melanoma      Past Surgical History:   Procedure Laterality Date    BREAST BIOPSY Right     benign    BREAST SURGERY      benign nodules right breast     CERVICAL BIOPSY  W/ LOOP ELECTRODE EXCISION      SKIN CANCER EXCISION      WRIST SURGERY Left 2017    several small soft tissues masses     OB History        6    Para   3    Term   3            AB   3    Living           SAB   3    TAB        Ectopic        Multiple        Live Births                   Family History   Problem Relation Age of Onset    Cancer Mother         breast     Breast cancer Mother     Cancer Father         melanoma    Melanoma Father     Cancer Paternal Grandmother     Breast cancer Paternal Grandmother     Cancer Paternal Grandfather     Cancer Sister         breast    Breast cancer Sister     Colon cancer Neg Hx     Ovarian cancer Neg Hx      Social History     Tobacco Use    Smoking status: Current Every Day Smoker     Packs/day: 0.40     Types: Cigarettes    Smokeless tobacco: Never Used    Tobacco comment: pt on chantix   Substance Use Topics    Alcohol use: No    Drug use: No       Current Outpatient Medications   Medication Sig    gabapentin (NEURONTIN) 100 MG capsule TK ONE C PO  QD UTD    medroxyPROGESTERone  (DEPO-PROVERA) 150 mg/mL Syrg Inject 1 mL (150 mg total) into the muscle every 3 (three) months.    varenicline (CHANTIX) 1 mg Tab Take 1 tablet (1 mg total) by mouth 2 (two) times daily.    buPROPion (WELLBUTRIN SR) 150 MG TBSR 12 hr tablet Take 1 tablet (150 mg total) by mouth 2 (two) times daily.    ibuprofen (ADVIL,MOTRIN) 800 MG tablet TK 1 T PO BID PRN WF OR MILK    medroxyPROGESTERone (PROVERA) 10 MG tablet Take 1 tablet (10 mg total) by mouth once daily.    triamcinolone acetonide 0.1% (KENALOG) 0.1 % ointment Apply topically 2 (two) times daily.     Current Facility-Administered Medications   Medication    medroxyPROGESTERone (DEPO-PROVERA) injection 150 mg         Review of Systems:  Review of Systems   Constitutional: Negative for activity change, fatigue, fever and unexpected weight change.   Respiratory: Negative for cough and shortness of breath.    Cardiovascular: Negative for chest pain and palpitations.   Gastrointestinal: Negative for abdominal pain, constipation, diarrhea and nausea.   Endocrine: Negative for hot flashes.   Genitourinary: Positive for vaginal bleeding. Negative for dyspareunia, dysuria, menorrhagia, menstrual problem, pelvic pain and vaginal discharge.   Musculoskeletal: Negative for back pain.   Integumentary:  Negative for nipple discharge.   Neurological: Negative for headaches.   Psychiatric/Behavioral: The patient is not nervous/anxious.    Breast: Negative for nipple discharge       OBJECTIVE:     Physical Exam:  Physical Exam   Constitutional: She is oriented to person, place, and time. She appears well-developed and well-nourished.   HENT:   Head: Normocephalic and atraumatic.   Neck: Normal range of motion. Neck supple.   Cardiovascular: Normal rate, regular rhythm, normal heart sounds and intact distal pulses.   Pulmonary/Chest: Effort normal and breath sounds normal.   Abdominal: Soft. Bowel sounds are normal. There is no tenderness. There is no guarding.    Genitourinary:   Genitourinary Comments: Exam declined   Neurological: She is alert and oriented to person, place, and time.   Skin: Skin is warm and dry.   Psychiatric: She has a normal mood and affect.   Vitals reviewed.        ASSESSMENT:       ICD-10-CM ICD-9-CM    1. On Depo-Provera for contraception Z30.42 V25.49           Plan:      Bleeding two weeks prior to depo injection  Advised to call pharmacy to see if they can fill it this week for early injection. This will be her third injection  Provera sent to pharmacy for PO use until injection can be filled  RTC for depo injection    Counseling time: 15 minutes    Elio Iglesias

## 2019-08-05 NOTE — Clinical Note
Pt seen in office today. She remains tobacco free a this time. Pt remains on tobacco cessation medication of chantix 1 mg BID and 21 mg nicotine patch QD. She has been using patches PRN. No adverse effects/mental changes noted at this time. Reviewed coping strategies/habitual behavior/relapse prevention with patient. Exhaled carbon monoxide level was 10 ppm per Smokerlyzer (0-6 non-smoker). 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 Will see pt back in office in 1 wk.

## 2019-08-05 NOTE — PROGRESS NOTES
Individual Follow-Up Form    8/5/2019    Clinical Status of Patient: Outpatient    Length of Service: 30 minutes    Continuing Medication: yes  Chantix or Patches    Other Medications: none     Target Symptoms: Withdrawal and medication side effects. The following were  rated moderate (3) to severe (4) on TCRS:  · Moderate (3): desire/crave tobacco  · Severe (4): none    Comments:  Pt seen in office today. She remains tobacco free a this time. Pt remains on tobacco cessation medication of chantix 1 mg BID and 21 mg nicotine patch QD. She has been using patches PRN. No adverse effects/mental changes noted at this time. Reviewed coping strategies/habitual behavior/relapse prevention with patient. Exhaled carbon monoxide level was 10 ppm per Smokerlyzer (0-6 non-smoker). 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 Will see pt back in office in 1 wk.     Diagnosis: F17.200    Next Visit: 1 week

## 2019-08-14 ENCOUNTER — CLINICAL SUPPORT (OUTPATIENT)
Dept: OBSTETRICS AND GYNECOLOGY | Facility: CLINIC | Age: 39
End: 2019-08-14
Payer: COMMERCIAL

## 2019-08-14 DIAGNOSIS — Z30.42 ON DEPO-PROVERA FOR CONTRACEPTION: Primary | ICD-10-CM

## 2019-08-14 PROCEDURE — 96372 PR INJECTION,THERAP/PROPH/DIAG2ST, IM OR SUBCUT: ICD-10-PCS | Mod: S$GLB,,, | Performed by: OBSTETRICS & GYNECOLOGY

## 2019-08-14 PROCEDURE — 99999 PR PBB SHADOW E&M-EST. PATIENT-LVL II: ICD-10-PCS | Mod: PBBFAC,,,

## 2019-08-14 PROCEDURE — 99999 PR PBB SHADOW E&M-EST. PATIENT-LVL II: CPT | Mod: PBBFAC,,,

## 2019-08-14 PROCEDURE — 96372 THER/PROPH/DIAG INJ SC/IM: CPT | Mod: S$GLB,,, | Performed by: OBSTETRICS & GYNECOLOGY

## 2019-08-14 RX ADMIN — MEDROXYPROGESTERONE ACETATE 150 MG: 150 INJECTION, SUSPENSION INTRAMUSCULAR at 01:08

## 2019-08-14 NOTE — PROGRESS NOTES
Pt here for Depo Provera Injection. Pt in time frame. Injection given in Left Gluteus at 1:06pm. Patient with no current complaints of pain prior to or after injection. Advised to wait 5 minutes and report any adverse reactions. Return between October 30th-November 13th for next injection. Pt verbalized understanding.   PATIENT SUPPLIED MEDICATION.  See medication Card for RX information  Order verified, inspected package,storage verified,expiration verified

## 2019-08-15 ENCOUNTER — PATIENT MESSAGE (OUTPATIENT)
Dept: OBSTETRICS AND GYNECOLOGY | Facility: CLINIC | Age: 39
End: 2019-08-15

## 2019-10-04 ENCOUNTER — TELEPHONE (OUTPATIENT)
Dept: SMOKING CESSATION | Facility: CLINIC | Age: 39
End: 2019-10-04

## 2019-10-11 ENCOUNTER — TELEPHONE (OUTPATIENT)
Dept: SMOKING CESSATION | Facility: CLINIC | Age: 39
End: 2019-10-11

## 2019-10-21 ENCOUNTER — TELEPHONE (OUTPATIENT)
Dept: OBSTETRICS AND GYNECOLOGY | Facility: CLINIC | Age: 39
End: 2019-10-21

## 2019-10-21 NOTE — TELEPHONE ENCOUNTER
----- Message from Lala Celestin sent at 10/21/2019  1:16 PM CDT -----  Contact: MANJINDER ARMIJO [[id number]]  Name of Who is Calling:MANJINDER ARMIJO [[id number]]      What is the request in detail: patient would like to schedule depo a week early. Please call to schedule      Can the clinic reply by [facility]:no      What [facility]: [phone]

## 2019-10-22 ENCOUNTER — TELEPHONE (OUTPATIENT)
Dept: OBSTETRICS AND GYNECOLOGY | Facility: CLINIC | Age: 39
End: 2019-10-22

## 2019-10-22 NOTE — TELEPHONE ENCOUNTER
----- Message from Lina Saldaña sent at 10/22/2019 12:17 PM CDT -----  Contact: MANJINDER ARMIJO [[id number]]  Type:  Patient Returning Call    Who Called: MANJINDER ARMIJO [[id number]]    Who Left Message for Patient: Dolly Sinclair LPN      Does the patient know what this is regarding?:yes    Best Call Back Number:[phone].    Additional Information:

## 2019-10-22 NOTE — TELEPHONE ENCOUNTER
Spoke with pt. Per Dr Kelsie Ramirez scheduled to be given early. appt scheduled. Pt verbalized understanding.

## 2019-10-25 ENCOUNTER — CLINICAL SUPPORT (OUTPATIENT)
Dept: OBSTETRICS AND GYNECOLOGY | Facility: CLINIC | Age: 39
End: 2019-10-25
Payer: COMMERCIAL

## 2019-10-25 DIAGNOSIS — Z30.42 ON DEPO-PROVERA FOR CONTRACEPTION: Primary | ICD-10-CM

## 2019-10-25 PROCEDURE — 96372 THER/PROPH/DIAG INJ SC/IM: CPT | Mod: S$GLB,,, | Performed by: OBSTETRICS & GYNECOLOGY

## 2019-10-25 PROCEDURE — 96372 PR INJECTION,THERAP/PROPH/DIAG2ST, IM OR SUBCUT: ICD-10-PCS | Mod: S$GLB,,, | Performed by: OBSTETRICS & GYNECOLOGY

## 2019-10-25 RX ADMIN — MEDROXYPROGESTERONE ACETATE 150 MG: 150 INJECTION, SUSPENSION INTRAMUSCULAR at 04:10

## 2019-10-25 NOTE — PROGRESS NOTES
Injection given early Per Dr Iglesias due to pt bleeding.    Pt here for Depo Provera Injection. Pt in time frame. Injection given in Right Gluteus at 4:49pm. Patient with no current complaints of pain prior to or after injection. Advised to wait 5 minutes and report any adverse reactions. Return between Jan 10th-Jan24th for next injection. Pt verbalized understanding.   PATIENT SUPPLIED MEDICATION.  See medication Card for RX information  Order verified, inspected package,storage verified,expiration verified

## 2020-01-09 ENCOUNTER — CLINICAL SUPPORT (OUTPATIENT)
Dept: OBSTETRICS AND GYNECOLOGY | Facility: CLINIC | Age: 40
End: 2020-01-09
Payer: COMMERCIAL

## 2020-01-09 DIAGNOSIS — Z30.42 ON DEPO-PROVERA FOR CONTRACEPTION: Primary | ICD-10-CM

## 2020-01-09 PROCEDURE — 99999 PR PBB SHADOW E&M-EST. PATIENT-LVL II: ICD-10-PCS | Mod: PBBFAC,,,

## 2020-01-09 PROCEDURE — 99999 PR PBB SHADOW E&M-EST. PATIENT-LVL II: CPT | Mod: PBBFAC,,,

## 2020-01-09 PROCEDURE — 96372 THER/PROPH/DIAG INJ SC/IM: CPT | Mod: S$GLB,,, | Performed by: OBSTETRICS & GYNECOLOGY

## 2020-01-09 PROCEDURE — 96372 PR INJECTION,THERAP/PROPH/DIAG2ST, IM OR SUBCUT: ICD-10-PCS | Mod: S$GLB,,, | Performed by: OBSTETRICS & GYNECOLOGY

## 2020-01-09 RX ORDER — MEDROXYPROGESTERONE ACETATE 150 MG/ML
INJECTION, SUSPENSION INTRAMUSCULAR
COMMUNITY
Start: 2018-08-01 | End: 2020-06-17 | Stop reason: SDUPTHER

## 2020-01-09 RX ADMIN — MEDROXYPROGESTERONE ACETATE 150 MG: 150 INJECTION, SUSPENSION INTRAMUSCULAR at 01:01

## 2020-01-09 NOTE — PROGRESS NOTES
Injection given early Per Dr Iglesias due to pt bleeding.     Pt here for Depo Provera Injection. Pt in time frame. Injection given in Left Gluteus at 1:01pm. Patient with no current complaints of pain prior to or after injection. Advised to wait 5 minutes and report any adverse reactions. Return between March 27th-April 10th for next injection. Pt verbalized understanding.   PATIENT SUPPLIED MEDICATION.  See medication Card for RX information  Order verified, inspected package,storage verified,expiration verified

## 2020-03-26 ENCOUNTER — CLINICAL SUPPORT (OUTPATIENT)
Dept: OBSTETRICS AND GYNECOLOGY | Facility: CLINIC | Age: 40
End: 2020-03-26
Payer: COMMERCIAL

## 2020-03-26 DIAGNOSIS — Z30.42 ON DEPO-PROVERA FOR CONTRACEPTION: Primary | ICD-10-CM

## 2020-03-26 PROCEDURE — 96372 THER/PROPH/DIAG INJ SC/IM: CPT | Mod: S$GLB,,, | Performed by: OBSTETRICS & GYNECOLOGY

## 2020-03-26 PROCEDURE — 99999 PR PBB SHADOW E&M-EST. PATIENT-LVL II: CPT | Mod: PBBFAC,,,

## 2020-03-26 PROCEDURE — 96372 PR INJECTION,THERAP/PROPH/DIAG2ST, IM OR SUBCUT: ICD-10-PCS | Mod: S$GLB,,, | Performed by: OBSTETRICS & GYNECOLOGY

## 2020-03-26 PROCEDURE — 99999 PR PBB SHADOW E&M-EST. PATIENT-LVL II: ICD-10-PCS | Mod: PBBFAC,,,

## 2020-03-26 RX ADMIN — MEDROXYPROGESTERONE ACETATE 150 MG: 150 INJECTION, SUSPENSION INTRAMUSCULAR at 11:03

## 2020-03-26 NOTE — PROGRESS NOTES
Injection given early Per Dr Estrada due to pt bleeding.     Pt here for Depo Provera Injection. Pt in time frame. Injection given in Right Gluteus at 11:36pm. Patient with no current complaints of pain prior to or after injection. Advised to wait 5 minutes and report any adverse reactions. Return between June 11th - June 25th for next injection. Pt verbalized understanding.   PATIENT SUPPLIED MEDICATION.  See medication Card for RX information  Order verified, inspected package,storage verified,expiration verified

## 2020-06-01 ENCOUNTER — OFFICE VISIT (OUTPATIENT)
Dept: OBSTETRICS AND GYNECOLOGY | Facility: CLINIC | Age: 40
End: 2020-06-01
Payer: COMMERCIAL

## 2020-06-01 VITALS — SYSTOLIC BLOOD PRESSURE: 126 MMHG | WEIGHT: 123 LBS | DIASTOLIC BLOOD PRESSURE: 64 MMHG | BODY MASS INDEX: 23.24 KG/M2

## 2020-06-01 DIAGNOSIS — N93.9 ABNORMAL UTERINE BLEEDING: ICD-10-CM

## 2020-06-01 DIAGNOSIS — Z01.419 WELL WOMAN EXAM WITH ROUTINE GYNECOLOGICAL EXAM: Primary | ICD-10-CM

## 2020-06-01 DIAGNOSIS — Z30.42 ON DEPO-PROVERA FOR CONTRACEPTION: ICD-10-CM

## 2020-06-01 PROCEDURE — 99999 PR PBB SHADOW E&M-EST. PATIENT-LVL III: CPT | Mod: PBBFAC,,, | Performed by: OBSTETRICS & GYNECOLOGY

## 2020-06-01 PROCEDURE — 99999 PR PBB SHADOW E&M-EST. PATIENT-LVL III: ICD-10-PCS | Mod: PBBFAC,,, | Performed by: OBSTETRICS & GYNECOLOGY

## 2020-06-01 PROCEDURE — 3008F BODY MASS INDEX DOCD: CPT | Mod: CPTII,S$GLB,, | Performed by: OBSTETRICS & GYNECOLOGY

## 2020-06-01 PROCEDURE — 99396 PR PREVENTIVE VISIT,EST,40-64: ICD-10-PCS | Mod: S$GLB,,, | Performed by: OBSTETRICS & GYNECOLOGY

## 2020-06-01 PROCEDURE — 3008F PR BODY MASS INDEX (BMI) DOCUMENTED: ICD-10-PCS | Mod: CPTII,S$GLB,, | Performed by: OBSTETRICS & GYNECOLOGY

## 2020-06-01 PROCEDURE — 99396 PREV VISIT EST AGE 40-64: CPT | Mod: S$GLB,,, | Performed by: OBSTETRICS & GYNECOLOGY

## 2020-06-01 RX ORDER — TIZANIDINE 4 MG/1
TABLET ORAL
COMMUNITY
Start: 2020-04-22 | End: 2021-06-28

## 2020-06-01 RX ORDER — MEDROXYPROGESTERONE ACETATE 150 MG/ML
150 INJECTION, SUSPENSION INTRAMUSCULAR
Qty: 1 SYRINGE | Refills: 3 | Status: SHIPPED | OUTPATIENT
Start: 2020-06-01 | End: 2020-06-05

## 2020-06-01 RX ORDER — MEDROXYPROGESTERONE ACETATE 10 MG/1
10 TABLET ORAL DAILY
Qty: 30 TABLET | Refills: 11 | Status: SHIPPED | OUTPATIENT
Start: 2020-06-01 | End: 2021-08-19

## 2020-06-01 NOTE — PROGRESS NOTES
SUBJECTIVE:   40 y.o. female   for annual routine Pap and checkup. No LMP recorded. Patient has had an injection..  PMHx significant for the morbidities listed below. Denies bleeding. Denies vaginal discharge. Denies urinary incontinence. Denies menopause. Is not taking HRT. Denies regular exercise. Reports tobacco usage but is beginning smoking cessation . Is not taking calcium and vitamin D supplements. Is not currently sexually active. Denies domestic violence.     Family history: denies history of breast, ovarian, endometrial and colon cancer  Abdominal surgeries: hernia repair    Pap 2019, WNL  MMG due today  Colonoscopy not indicated           Past Medical History:   Diagnosis Date    Basal cell carcinoma     Cancer     skin cancer , basal cell CA     Cancer     cervical cancer    Fibrocystic breast     Melanoma      Past Surgical History:   Procedure Laterality Date    BREAST BIOPSY Right     benign    BREAST SURGERY      benign nodules right breast     CERVICAL BIOPSY  W/ LOOP ELECTRODE EXCISION      SKIN CANCER EXCISION      WRIST SURGERY Left 2017    several small soft tissues masses     Social History     Socioeconomic History    Marital status:      Spouse name: Not on file    Number of children: Not on file    Years of education: Not on file    Highest education level: Not on file   Occupational History    Occupation: teacher   Social Needs    Financial resource strain: Not on file    Food insecurity:     Worry: Not on file     Inability: Not on file    Transportation needs:     Medical: Not on file     Non-medical: Not on file   Tobacco Use    Smoking status: Current Every Day Smoker     Packs/day: 0.40     Types: Cigarettes    Smokeless tobacco: Never Used    Tobacco comment: pt on chantix   Substance and Sexual Activity    Alcohol use: No    Drug use: No    Sexual activity: Yes     Birth control/protection: Injection   Lifestyle    Physical activity:      Days per week: Not on file     Minutes per session: Not on file    Stress: Not on file   Relationships    Social connections:     Talks on phone: Not on file     Gets together: Not on file     Attends [employer] service: Not on file     Active member of club or organization: Not on file     Attends meetings of clubs or organizations: Not on file     Relationship status: Not on file   Other Topics Concern    Are you pregnant or think you may be? Not Asked    Breast-feeding Not Asked   Social History Narrative    Not on file     Family History   Problem Relation Age of Onset    Cancer Mother         breast     Breast cancer Mother     Cancer Father         melanoma    Melanoma Father     Cancer Paternal Grandmother     Breast cancer Paternal Grandmother     Cancer Paternal Grandfather     Cancer Sister         breast    Breast cancer Sister     Colon cancer Neg Hx     Ovarian cancer Neg Hx      OB History    Para Term  AB Living   6 3 3   3     SAB TAB Ectopic Multiple Live Births   3              # Outcome Date GA Lbr [place]/2nd Weight Sex Delivery Anes PTL Lv   6 SAB            5 SAB            4 SAB            3 Term            2 Term            1 Term                  Current Outpatient Medications   Medication Sig Dispense Refill    buPROPion (WELLBUTRIN) 75 MG tablet Take 1 tablet (75 mg total) by mouth 2 (two) times daily. 60 tablet 2    ergocalciferol (VITAMIN D2) 50,000 unit Cap Take 1 capsule (50,000 Units total) by mouth every 7 days. 12 capsule 0    gabapentin (NEURONTIN) 100 MG capsule TK ONE C PO  QD UTD  1    hydrOXYzine pamoate (VISTARIL) 25 MG Cap   0    medroxyPROGESTERone (DEPO-PROVERA) 150 mg/mL Syrg       medroxyPROGESTERone (PROVERA) 10 MG tablet Take 1 tablet (10 mg total) by mouth once daily. 30 tablet 11    simvastatin (ZOCOR) 10 MG tablet Take 1 tablet (10 mg total) by mouth every evening. 90 tablet 0    tiZANidine (ZANAFLEX) 4 MG tablet TK 1 TO 2 TS PO  AT BEDTIME PRN      medroxyPROGESTERone (DEPO-PROVERA) 150 mg/mL Syrg Inject 1 mL (150 mg total) into the muscle every 3 (three) months. for 4 days 1 Syringe 3     Current Facility-Administered Medications   Medication Dose Route Frequency Provider Last Rate Last Dose    medroxyPROGESTERone (DEPO-PROVERA) injection 150 mg  150 mg Intramuscular Q90 Days Elio Iglesias MD   150 mg at 03/26/20 1136     Allergies: Patient has no known allergies.     ROS:  Constitutional: no weight loss, weight gain, fever, fatigue  Eyes:  No vision changes, glasses/contacts  ENT/Mouth: No ulcers, sinus problems, ears ringing, headache  Cardiovascular: No inability to lie flat, chest pain, exercise intolerance, swelling, heart palpitations  Respiratory: No wheezing, coughing blood, shortness of breath, or cough  Gastrointestinal: No diarrhea, bloody stool, nausea/vomiting, constipation, gas, hemorrhoids  Genitourinary: No blood in urine, painful urination, urgency of urination, frequency of urination, incomplete emptying, incontinence, abnormal bleeding, painful periods, heavy periods, vaginal discharge, vaginal odor, painful intercourse, sexual problems, bleeding after intercourse.  Musculoskeletal: No muscle weakness  Skin/Breast: No painful breasts, nipple discharge, masses, rash, ulcers  Neurological: No passing out, seizures, numbness, headache  Endocrine: No diabetes, hypothyroid, hyperthyroid, hot flashes, hair loss, abnormal hair growth, ance  Psychiatric: No depression, crying  Hematologic: No bruises, bleeding, swollen lymph nodes, anemia.      OBJECTIVE:   The patient appears well, alert, oriented x 3, in no distress.  /64   Wt 55.8 kg (123 lb 0.3 oz)   BMI 23.24 kg/m²   NECK: negative, no thyromegaly, trachea midline  SKIN: normal and no acne, striae, hirsutism  BREAST EXAM: breasts appear normal, no suspicious masses, no skin or nipple changes or axillary nodes  ABDOMEN: soft, non-tender; bowel sounds normal; no  masses,  no organomegaly  GENITALIA: normal external genitalia, no erythema, no discharge  URETHRA: normal appearing urethra with no masses, tenderness or lesions  VAGINA: Normal  CERVIX: no lesions or cervical motion tenderness  UTERUS: normal size, contour, position, consistency, mobility, non-tender  ADNEXA: normal adnexa    \  ASSESSMENT:   Tita was seen today for vaginal bleeding.    Diagnoses and all orders for this visit:    Well woman exam with routine gynecological exam  -     Mammo Digital Screening Bilat; Future    On Depo-Provera for contraception  -     medroxyPROGESTERone (PROVERA) 10 MG tablet; Take 1 tablet (10 mg total) by mouth once daily.  -     medroxyPROGESTERone (DEPO-PROVERA) 150 mg/mL Syrg; Inject 1 mL (150 mg total) into the muscle every 3 (three) months. for 4 days    Abnormal uterine bleeding    - counseled patient that combining provera with depo provera is not on-label prescription and there is a small risk of unintended unknown side effects from this combination, patient voiced understanding. Has been on multiple forms of contraception in the past and this has been the regimen that has helped her irregular cycles.    Orders Placed This Encounter   Procedures    Mammo Digital Screening Bilat       Follow up in about 1 year (around 6/1/2021).

## 2020-06-08 ENCOUNTER — CLINICAL SUPPORT (OUTPATIENT)
Dept: SMOKING CESSATION | Facility: CLINIC | Age: 40
End: 2020-06-08
Payer: COMMERCIAL

## 2020-06-08 DIAGNOSIS — F17.200 NICOTINE DEPENDENCE: Primary | ICD-10-CM

## 2020-06-08 PROCEDURE — 99407 BEHAV CHNG SMOKING > 10 MIN: CPT | Mod: S$GLB,,,

## 2020-06-08 PROCEDURE — 99407 PR TOBACCO USE CESSATION INTENSIVE >10 MINUTES: ICD-10-PCS | Mod: S$GLB,,,

## 2020-06-08 NOTE — PROGRESS NOTES
Called pt to f/u on her 12 month smoking cessation quit status. Pt stated she was tobacco free for about a month when she first started program, but then relapsed. Informed her she has benefits available and is able to rejoin. Pt scheduled appointment for 6/17/2020. Informed her of benefit period, phone follow ups, and contact information. Will complete smart form and resolve quit #2 episode. Will continue to follow up on quit #3 episode.

## 2020-06-11 ENCOUNTER — CLINICAL SUPPORT (OUTPATIENT)
Dept: OBSTETRICS AND GYNECOLOGY | Facility: CLINIC | Age: 40
End: 2020-06-11
Payer: COMMERCIAL

## 2020-06-11 DIAGNOSIS — Z30.42 ON DEPO-PROVERA FOR CONTRACEPTION: Primary | ICD-10-CM

## 2020-06-11 PROCEDURE — 99999 PR PBB SHADOW E&M-EST. PATIENT-LVL III: CPT | Mod: PBBFAC,,,

## 2020-06-11 PROCEDURE — 96372 PR INJECTION,THERAP/PROPH/DIAG2ST, IM OR SUBCUT: ICD-10-PCS | Mod: S$GLB,,, | Performed by: NURSE PRACTITIONER

## 2020-06-11 PROCEDURE — 96372 THER/PROPH/DIAG INJ SC/IM: CPT | Mod: S$GLB,,, | Performed by: NURSE PRACTITIONER

## 2020-06-11 PROCEDURE — 99999 PR PBB SHADOW E&M-EST. PATIENT-LVL III: ICD-10-PCS | Mod: PBBFAC,,,

## 2020-06-11 RX ADMIN — MEDROXYPROGESTERONE ACETATE 150 MG: 150 INJECTION, SUSPENSION INTRAMUSCULAR at 01:06

## 2020-06-11 NOTE — PROGRESS NOTES
Pt here for Depo Provera Injection. Pt in time frame. Injection given in Left Gluteus at 1:06pm. Patient with no current complaints of pain prior to or after injection. Advised to wait 5 minutes and report any adverse reactions. Return between August 27th - September 10th for next injection. Pt verbalized understanding.   PATIENT SUPPLIED MEDICATION.  See medication Card for RX information  Order verified, inspected package,storage verified,expiration verified

## 2020-08-10 ENCOUNTER — TELEPHONE (OUTPATIENT)
Dept: SMOKING CESSATION | Facility: CLINIC | Age: 40
End: 2020-08-10

## 2020-08-10 NOTE — TELEPHONE ENCOUNTER
Pt called regarding re-entering tobacco cessation program. Spoke with her and [facility]

## 2020-08-27 ENCOUNTER — CLINICAL SUPPORT (OUTPATIENT)
Dept: SMOKING CESSATION | Facility: CLINIC | Age: 40
End: 2020-08-27
Payer: COMMERCIAL

## 2020-08-27 DIAGNOSIS — F17.200 NICOTINE DEPENDENCE: ICD-10-CM

## 2020-08-27 PROCEDURE — 99404 PREV MED CNSL INDIV APPRX 60: CPT | Mod: S$GLB,,,

## 2020-08-27 PROCEDURE — 99404 PR PREVENT COUNSEL,INDIV,60 MIN: ICD-10-PCS | Mod: S$GLB,,,

## 2020-08-27 RX ORDER — VARENICLINE TARTRATE 0.5 (11)-1
KIT ORAL
Qty: 1 PACKAGE | Refills: 0 | Status: SHIPPED | OUTPATIENT
Start: 2020-08-27 | End: 2020-08-27

## 2020-08-27 RX ORDER — IBUPROFEN 200 MG
1 TABLET ORAL DAILY
Qty: 28 PATCH | Refills: 0 | Status: SHIPPED | OUTPATIENT
Start: 2020-08-27 | End: 2020-09-24 | Stop reason: SDUPTHER

## 2020-08-27 RX ORDER — VARENICLINE TARTRATE 0.5 (11)-1
KIT ORAL
Qty: 53 TABLET | Refills: 0 | Status: SHIPPED | OUTPATIENT
Start: 2020-08-27 | End: 2020-09-24 | Stop reason: ALTCHOICE

## 2020-08-27 NOTE — Clinical Note
Pt seen at intake today. She currently smokes 20 cigs/day. Discussed tobacco cessation medication of chantix starter pack and 21 mg nicotine patch QD. Pt started on rate reduction and wait time of 15 min prior to smoking. Exhaled carbon monoxide level was 16 (0-6 non-smoker). Will see pt back in office in 1 wk.

## 2020-09-16 ENCOUNTER — TELEPHONE (OUTPATIENT)
Dept: OBSTETRICS AND GYNECOLOGY | Facility: CLINIC | Age: 40
End: 2020-09-16

## 2020-09-16 NOTE — TELEPHONE ENCOUNTER
----- Message from Chyna Jordan sent at 9/15/2020 12:39 PM CDT -----  Regarding: Appointment  Appointment Request From: Tita Campos    With Provider: JANNY NURSE    Preferred Date Range: 9/15/2020 - 9/25/2020    Preferred Times: Monday, Tuesday, Wednesday, Thursday, Friday   4:00 PM - 6:00 PM, 1:15 PM - 6:00 PM    Reason for visit: Need Depo Shot    Comments:  I just need to be given the injection.

## 2020-09-23 ENCOUNTER — PATIENT MESSAGE (OUTPATIENT)
Dept: OBSTETRICS AND GYNECOLOGY | Facility: CLINIC | Age: 40
End: 2020-09-23

## 2020-09-24 ENCOUNTER — CLINICAL SUPPORT (OUTPATIENT)
Dept: SMOKING CESSATION | Facility: CLINIC | Age: 40
End: 2020-09-24
Payer: COMMERCIAL

## 2020-09-24 DIAGNOSIS — F17.200 NICOTINE DEPENDENCE: ICD-10-CM

## 2020-09-24 PROCEDURE — 99402 PREV MED CNSL INDIV APPRX 30: CPT | Mod: S$GLB,,,

## 2020-09-24 PROCEDURE — 99402 PR PREVENT COUNSEL,INDIV,30 MIN: ICD-10-PCS | Mod: S$GLB,,,

## 2020-09-24 RX ORDER — NICOTINE 7MG/24HR
1 PATCH, TRANSDERMAL 24 HOURS TRANSDERMAL DAILY
Qty: 14 PATCH | Refills: 0 | Status: SHIPPED | OUTPATIENT
Start: 2020-09-24 | End: 2021-05-10 | Stop reason: ALTCHOICE

## 2020-09-24 RX ORDER — IBUPROFEN 200 MG
1 TABLET ORAL DAILY
Qty: 28 PATCH | Refills: 0 | Status: SHIPPED | OUTPATIENT
Start: 2020-09-24 | End: 2021-05-10 | Stop reason: ALTCHOICE

## 2020-09-24 RX ORDER — VARENICLINE TARTRATE 1 MG/1
1 TABLET, FILM COATED ORAL 2 TIMES DAILY
Qty: 56 TABLET | Refills: 0 | Status: SHIPPED | OUTPATIENT
Start: 2020-09-24 | End: 2020-12-11 | Stop reason: SDUPTHER

## 2020-09-24 NOTE — PROGRESS NOTES
Individual Follow-Up Form    9/24/2020    Clinical Status of Patient: Outpatient    Length of Service: 30 minutes    Continuing Medication: yes  Chantix or Patches    Other Medications: none     Target Symptoms: Withdrawal and medication side effects. The following were rated moderate (3) to severe (4) on TCRS:  · Moderate (3): desire/crave tobacco  · Severe (4): none    Comments:  Pt seen in office today. She continues to smoke 3-4 cigs/day. Pt remains on tobacco cessation medication of chantix 1 mg BID and 21 mg nicotine patch QD. She states she has been waiting to put on nicotine patch. 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 Discussed adding a 7 mg patch to evening to help with cravings. She will try. No adverse effects/mental changes noted at this time. Pt asked to continue with rate reduction plan. Reviewed coping strategies/habitual behavior with patient. Exhaled carbon monoxide level was13 ppm per Smokerlyzer (0-6 non-smoker). Will see pt back in office in 1 wk.     Diagnosis: F17.200    Next Visit: 1 week

## 2020-09-24 NOTE — Clinical Note
Pt seen in office today. She continues to smoke 3-4 cigs/day. Pt remains on tobacco cessation medication of chantix 1 mg BID and 21 mg nicotine patch QD. She states she has been waiting to put on nicotine patch. 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 Discussed adding a 7 mg patch to evening to help with cravings. She will try. No adverse effects/mental changes noted at this time. Pt asked to continue with rate reduction plan. Reviewed coping strategies/habitual behavior with patient. Exhaled carbon monoxide level was13 ppm per Smokerlyzer (0-6 non-smoker). Will see pt back in office in 1 wk.

## 2020-09-25 ENCOUNTER — CLINICAL SUPPORT (OUTPATIENT)
Dept: OBSTETRICS AND GYNECOLOGY | Facility: CLINIC | Age: 40
End: 2020-09-25
Payer: COMMERCIAL

## 2020-09-25 PROCEDURE — 96372 THER/PROPH/DIAG INJ SC/IM: CPT | Mod: S$GLB,,, | Performed by: NURSE PRACTITIONER

## 2020-09-25 PROCEDURE — 96372 PR INJECTION,THERAP/PROPH/DIAG2ST, IM OR SUBCUT: ICD-10-PCS | Mod: S$GLB,,, | Performed by: NURSE PRACTITIONER

## 2020-09-25 RX ADMIN — MEDROXYPROGESTERONE ACETATE 150 MG: 150 INJECTION, SUSPENSION INTRAMUSCULAR at 01:09

## 2020-09-25 NOTE — PROGRESS NOTES
Pregnancy test needed to be performed, arrived out of timeframe. Here for Depo Provera Injection, UPT obtained with Negative  results . Patient with no current complaints of pain prior to or after injection. Advised to wait 5 minutes. Return between December 11-25, 2020 for next injection.      PATIENT SUPPLIED MEDICATION.    See medication Card for RX information    Order verified, inspected package,storage verified,expiration verified    APPOINTMENT MADE FOR NEXT INJECTION    Site - rb

## 2020-10-01 ENCOUNTER — CLINICAL SUPPORT (OUTPATIENT)
Dept: SMOKING CESSATION | Facility: CLINIC | Age: 40
End: 2020-10-01
Payer: COMMERCIAL

## 2020-10-01 DIAGNOSIS — F17.200 NICOTINE DEPENDENCE: ICD-10-CM

## 2020-10-01 PROCEDURE — 99402 PREV MED CNSL INDIV APPRX 30: CPT | Mod: S$GLB,,,

## 2020-10-01 PROCEDURE — 99402 PR PREVENT COUNSEL,INDIV,30 MIN: ICD-10-PCS | Mod: S$GLB,,,

## 2020-10-01 NOTE — Clinical Note
Pt seen in office today. She continues to smoke 6 cigs/day. Pt remains on tobacco cessation medication of chantix starter pack and 21 mg nicotine patch QD. No adverse effects/mental changes noted at this time. Pt asked to reduce current smoking rate by 2 cigs/day. Reviewed coping strategies/stress management/habitual behavior with patient. Exhaled carbon monoxide level was 15 ppm per Smokerlyzer (0-6 non-smoker). Will see pt back in office in 1 wk.

## 2020-10-01 NOTE — PROGRESS NOTES
Individual Follow-Up Form    10/1/2020    Clinical Status of Patient: Outpatient    Length of Service: 30 minutes    Continuing Medication: yes  Chantix or Patches    Other Medications: none     Target Symptoms: Withdrawal and medication side effects. The following were rated moderate (3) to severe (4) on TCRS:  · Moderate (3): desire/crave tobacco  · Severe (4): none    Comments:  Pt seen in office today. She continues to smoke 6 cigs/day. Pt remains on tobacco cessation medication of chantix starter pack and 21 mg nicotine patch QD. No adverse effects/mental changes noted at this time. Pt asked to reduce current smoking rate by 2 cigs/day. Reviewed coping strategies/stress management/habitual behavior with patient. Exhaled carbon monoxide level was 15 ppm per Smokerlyzer (0-6 non-smoker). Will see pt back in office in 1 wk.     Diagnosis: F17.200    Next Visit: 1 week

## 2021-01-28 ENCOUNTER — CLINICAL SUPPORT (OUTPATIENT)
Dept: OBSTETRICS AND GYNECOLOGY | Facility: CLINIC | Age: 41
End: 2021-01-28
Payer: COMMERCIAL

## 2021-01-28 DIAGNOSIS — Z30.9 ENCOUNTER FOR CONTRACEPTIVE MANAGEMENT, UNSPECIFIED TYPE: Primary | ICD-10-CM

## 2021-01-28 LAB
B-HCG UR QL: NEGATIVE
CTP QC/QA: YES

## 2021-01-28 PROCEDURE — 99999 PR PBB SHADOW E&M-EST. PATIENT-LVL II: ICD-10-PCS | Mod: PBBFAC,,,

## 2021-01-28 PROCEDURE — 96372 PR INJECTION,THERAP/PROPH/DIAG2ST, IM OR SUBCUT: ICD-10-PCS | Mod: S$GLB,,, | Performed by: STUDENT IN AN ORGANIZED HEALTH CARE EDUCATION/TRAINING PROGRAM

## 2021-01-28 PROCEDURE — 96372 THER/PROPH/DIAG INJ SC/IM: CPT | Mod: S$GLB,,, | Performed by: STUDENT IN AN ORGANIZED HEALTH CARE EDUCATION/TRAINING PROGRAM

## 2021-01-28 PROCEDURE — 99999 PR PBB SHADOW E&M-EST. PATIENT-LVL II: CPT | Mod: PBBFAC,,,

## 2021-01-28 RX ADMIN — MEDROXYPROGESTERONE ACETATE 150 MG: 150 INJECTION, SUSPENSION INTRAMUSCULAR at 08:01

## 2021-04-26 ENCOUNTER — PATIENT MESSAGE (OUTPATIENT)
Dept: RESEARCH | Facility: HOSPITAL | Age: 41
End: 2021-04-26

## 2021-05-03 ENCOUNTER — CLINICAL SUPPORT (OUTPATIENT)
Dept: SMOKING CESSATION | Facility: CLINIC | Age: 41
End: 2021-05-03
Payer: COMMERCIAL

## 2021-05-03 DIAGNOSIS — F17.200 NICOTINE DEPENDENCE: Primary | ICD-10-CM

## 2021-05-03 PROCEDURE — 99407 BEHAV CHNG SMOKING > 10 MIN: CPT | Mod: S$GLB,,,

## 2021-05-03 PROCEDURE — 99407 PR TOBACCO USE CESSATION INTENSIVE >10 MINUTES: ICD-10-PCS | Mod: S$GLB,,,

## 2021-05-10 ENCOUNTER — CLINICAL SUPPORT (OUTPATIENT)
Dept: SMOKING CESSATION | Facility: CLINIC | Age: 41
End: 2021-05-10
Payer: COMMERCIAL

## 2021-05-10 DIAGNOSIS — F17.200 NICOTINE DEPENDENCE: ICD-10-CM

## 2021-05-10 PROCEDURE — 99404 PREV MED CNSL INDIV APPRX 60: CPT | Mod: S$GLB,,,

## 2021-05-10 PROCEDURE — 99404 PR PREVENT COUNSEL,INDIV,60 MIN: ICD-10-PCS | Mod: S$GLB,,,

## 2021-05-10 RX ORDER — IBUPROFEN 200 MG
1 TABLET ORAL DAILY
Qty: 28 PATCH | Refills: 0 | Status: SHIPPED | OUTPATIENT
Start: 2021-05-10 | End: 2022-02-07

## 2021-05-10 RX ORDER — VARENICLINE TARTRATE 0.5 (11)-1
KIT ORAL
Qty: 53 TABLET | Refills: 0 | Status: SHIPPED | OUTPATIENT
Start: 2021-05-10 | End: 2021-06-15 | Stop reason: ALTCHOICE

## 2021-05-18 ENCOUNTER — CLINICAL SUPPORT (OUTPATIENT)
Dept: SMOKING CESSATION | Facility: CLINIC | Age: 41
End: 2021-05-18
Payer: COMMERCIAL

## 2021-05-18 DIAGNOSIS — F17.200 NICOTINE DEPENDENCE: ICD-10-CM

## 2021-05-18 PROCEDURE — 99402 PR PREVENT COUNSEL,INDIV,30 MIN: ICD-10-PCS | Mod: S$GLB,,,

## 2021-05-18 PROCEDURE — 99402 PREV MED CNSL INDIV APPRX 30: CPT | Mod: S$GLB,,,

## 2021-05-20 ENCOUNTER — TELEPHONE (OUTPATIENT)
Dept: OBSTETRICS AND GYNECOLOGY | Facility: CLINIC | Age: 41
End: 2021-05-20

## 2021-06-01 ENCOUNTER — CLINICAL SUPPORT (OUTPATIENT)
Dept: SMOKING CESSATION | Facility: CLINIC | Age: 41
End: 2021-06-01
Payer: COMMERCIAL

## 2021-06-01 DIAGNOSIS — F17.200 NICOTINE DEPENDENCE: ICD-10-CM

## 2021-06-01 PROCEDURE — 99401 PR PREVENT COUNSEL,INDIV,15 MIN: ICD-10-PCS | Mod: S$GLB,,,

## 2021-06-01 PROCEDURE — 99401 PREV MED CNSL INDIV APPRX 15: CPT | Mod: S$GLB,,,

## 2021-06-03 ENCOUNTER — OFFICE VISIT (OUTPATIENT)
Dept: OBSTETRICS AND GYNECOLOGY | Facility: CLINIC | Age: 41
End: 2021-06-03
Payer: COMMERCIAL

## 2021-06-03 VITALS
DIASTOLIC BLOOD PRESSURE: 68 MMHG | BODY MASS INDEX: 24.26 KG/M2 | SYSTOLIC BLOOD PRESSURE: 112 MMHG | WEIGHT: 128.44 LBS

## 2021-06-03 DIAGNOSIS — Z12.31 ENCOUNTER FOR SCREENING MAMMOGRAM FOR MALIGNANT NEOPLASM OF BREAST: ICD-10-CM

## 2021-06-03 DIAGNOSIS — Z30.42 ENCOUNTER FOR SURVEILLANCE OF INJECTABLE CONTRACEPTIVE: ICD-10-CM

## 2021-06-03 DIAGNOSIS — Z01.419 ENCOUNTER FOR WELL WOMAN EXAM WITH ROUTINE GYNECOLOGICAL EXAM: Primary | ICD-10-CM

## 2021-06-03 LAB
B-HCG UR QL: NEGATIVE
CTP QC/QA: YES

## 2021-06-03 PROCEDURE — 88142 CYTOPATH C/V THIN LAYER: CPT | Performed by: NURSE PRACTITIONER

## 2021-06-03 PROCEDURE — 99999 PR PBB SHADOW E&M-EST. PATIENT-LVL III: CPT | Mod: PBBFAC,,, | Performed by: NURSE PRACTITIONER

## 2021-06-03 PROCEDURE — 3008F BODY MASS INDEX DOCD: CPT | Mod: CPTII,S$GLB,, | Performed by: NURSE PRACTITIONER

## 2021-06-03 PROCEDURE — 1126F PR PAIN SEVERITY QUANTIFIED, NO PAIN PRESENT: ICD-10-PCS | Mod: S$GLB,,, | Performed by: NURSE PRACTITIONER

## 2021-06-03 PROCEDURE — 81025 POCT URINE PREGNANCY: ICD-10-PCS | Mod: S$GLB,,, | Performed by: NURSE PRACTITIONER

## 2021-06-03 PROCEDURE — 99396 PREV VISIT EST AGE 40-64: CPT | Mod: S$GLB,,, | Performed by: NURSE PRACTITIONER

## 2021-06-03 PROCEDURE — 99999 PR PBB SHADOW E&M-EST. PATIENT-LVL III: ICD-10-PCS | Mod: PBBFAC,,, | Performed by: NURSE PRACTITIONER

## 2021-06-03 PROCEDURE — 87624 HPV HI-RISK TYP POOLED RSLT: CPT | Performed by: NURSE PRACTITIONER

## 2021-06-03 PROCEDURE — 81025 URINE PREGNANCY TEST: CPT | Mod: S$GLB,,, | Performed by: NURSE PRACTITIONER

## 2021-06-03 PROCEDURE — 99396 PR PREVENTIVE VISIT,EST,40-64: ICD-10-PCS | Mod: S$GLB,,, | Performed by: NURSE PRACTITIONER

## 2021-06-03 PROCEDURE — 1126F AMNT PAIN NOTED NONE PRSNT: CPT | Mod: S$GLB,,, | Performed by: NURSE PRACTITIONER

## 2021-06-03 PROCEDURE — 3008F PR BODY MASS INDEX (BMI) DOCUMENTED: ICD-10-PCS | Mod: CPTII,S$GLB,, | Performed by: NURSE PRACTITIONER

## 2021-06-03 RX ORDER — HYDROCODONE BITARTRATE AND ACETAMINOPHEN 5; 325 MG/1; MG/1
1 TABLET ORAL EVERY 6 HOURS PRN
COMMUNITY
Start: 2021-05-26 | End: 2021-06-28

## 2021-06-08 ENCOUNTER — CLINICAL SUPPORT (OUTPATIENT)
Dept: SMOKING CESSATION | Facility: CLINIC | Age: 41
End: 2021-06-08
Payer: COMMERCIAL

## 2021-06-08 ENCOUNTER — HOSPITAL ENCOUNTER (OUTPATIENT)
Dept: RADIOLOGY | Facility: HOSPITAL | Age: 41
Discharge: HOME | End: 2021-06-08
Attending: NURSE PRACTITIONER
Payer: COMMERCIAL

## 2021-06-08 ENCOUNTER — PATIENT MESSAGE (OUTPATIENT)
Dept: OBSTETRICS AND GYNECOLOGY | Facility: CLINIC | Age: 41
End: 2021-06-08

## 2021-06-08 DIAGNOSIS — F17.200 NICOTINE DEPENDENCE: ICD-10-CM

## 2021-06-08 DIAGNOSIS — Z12.31 ENCOUNTER FOR SCREENING MAMMOGRAM FOR MALIGNANT NEOPLASM OF BREAST: ICD-10-CM

## 2021-06-08 PROCEDURE — 77067 SCR MAMMO BI INCL CAD: CPT | Mod: 26,,, | Performed by: RADIOLOGY

## 2021-06-08 PROCEDURE — 77063 MAMMO DIGITAL SCREENING BILAT WITH TOMO: ICD-10-PCS | Mod: 26,,, | Performed by: RADIOLOGY

## 2021-06-08 PROCEDURE — 99402 PREV MED CNSL INDIV APPRX 30: CPT | Mod: S$GLB,,,

## 2021-06-08 PROCEDURE — 77063 BREAST TOMOSYNTHESIS BI: CPT | Mod: 26,,, | Performed by: RADIOLOGY

## 2021-06-08 PROCEDURE — 77067 MAMMO DIGITAL SCREENING BILAT WITH TOMO: ICD-10-PCS | Mod: 26,,, | Performed by: RADIOLOGY

## 2021-06-08 PROCEDURE — 77067 SCR MAMMO BI INCL CAD: CPT | Mod: TC,PN

## 2021-06-08 PROCEDURE — 99402 PR PREVENT COUNSEL,INDIV,30 MIN: ICD-10-PCS | Mod: S$GLB,,,

## 2021-06-09 LAB
FINAL PATHOLOGIC DIAGNOSIS: NORMAL
Lab: NORMAL

## 2021-06-10 ENCOUNTER — TELEPHONE (OUTPATIENT)
Dept: OBSTETRICS AND GYNECOLOGY | Facility: CLINIC | Age: 41
End: 2021-06-10

## 2021-06-10 DIAGNOSIS — Z91.89 AT HIGH RISK FOR BREAST CANCER: Primary | ICD-10-CM

## 2021-06-10 LAB
HPV HR 12 DNA SPEC QL NAA+PROBE: NEGATIVE
HPV16 AG SPEC QL: NEGATIVE
HPV18 DNA SPEC QL NAA+PROBE: NEGATIVE

## 2021-06-15 ENCOUNTER — CLINICAL SUPPORT (OUTPATIENT)
Dept: SMOKING CESSATION | Facility: CLINIC | Age: 41
End: 2021-06-15
Payer: COMMERCIAL

## 2021-06-15 DIAGNOSIS — F17.200 NICOTINE DEPENDENCE: Primary | ICD-10-CM

## 2021-06-15 PROCEDURE — 99401 PR PREVENT COUNSEL,INDIV,15 MIN: ICD-10-PCS | Mod: S$GLB,,,

## 2021-06-15 PROCEDURE — 99401 PREV MED CNSL INDIV APPRX 15: CPT | Mod: S$GLB,,,

## 2021-06-15 RX ORDER — VARENICLINE TARTRATE 1 MG/1
1 TABLET, FILM COATED ORAL 2 TIMES DAILY
Qty: 30 TABLET | Refills: 0 | Status: SHIPPED | OUTPATIENT
Start: 2021-06-15 | End: 2021-07-12 | Stop reason: SDUPTHER

## 2021-06-22 ENCOUNTER — CLINICAL SUPPORT (OUTPATIENT)
Dept: SMOKING CESSATION | Facility: CLINIC | Age: 41
End: 2021-06-22
Payer: COMMERCIAL

## 2021-06-22 DIAGNOSIS — F17.200 NICOTINE DEPENDENCE: ICD-10-CM

## 2021-06-22 PROCEDURE — 99402 PREV MED CNSL INDIV APPRX 30: CPT | Mod: S$GLB,,,

## 2021-06-22 PROCEDURE — 99402 PR PREVENT COUNSEL,INDIV,30 MIN: ICD-10-PCS | Mod: S$GLB,,,

## 2021-06-28 ENCOUNTER — OFFICE VISIT (OUTPATIENT)
Dept: HEMATOLOGY/ONCOLOGY | Facility: CLINIC | Age: 41
End: 2021-06-28
Payer: COMMERCIAL

## 2021-06-28 VITALS
HEART RATE: 83 BPM | WEIGHT: 129 LBS | DIASTOLIC BLOOD PRESSURE: 57 MMHG | HEIGHT: 61 IN | SYSTOLIC BLOOD PRESSURE: 105 MMHG | BODY MASS INDEX: 24.35 KG/M2

## 2021-06-28 DIAGNOSIS — R92.30 DENSE BREASTS: ICD-10-CM

## 2021-06-28 DIAGNOSIS — Z80.3 FAMILY HISTORY OF BREAST CANCER: ICD-10-CM

## 2021-06-28 DIAGNOSIS — Z12.39 BREAST CANCER SCREENING, HIGH RISK PATIENT: ICD-10-CM

## 2021-06-28 DIAGNOSIS — Z91.89 INCREASED RISK OF BREAST CANCER: Primary | ICD-10-CM

## 2021-06-28 DIAGNOSIS — Z91.89 AT HIGH RISK FOR BREAST CANCER: ICD-10-CM

## 2021-06-28 PROCEDURE — 99999 PR PBB SHADOW E&M-EST. PATIENT-LVL IV: ICD-10-PCS | Mod: PBBFAC,,, | Performed by: PHYSICIAN ASSISTANT

## 2021-06-28 PROCEDURE — 1126F AMNT PAIN NOTED NONE PRSNT: CPT | Mod: S$GLB,,, | Performed by: PHYSICIAN ASSISTANT

## 2021-06-28 PROCEDURE — 99999 PR PBB SHADOW E&M-EST. PATIENT-LVL IV: CPT | Mod: PBBFAC,,, | Performed by: PHYSICIAN ASSISTANT

## 2021-06-28 PROCEDURE — 1126F PR PAIN SEVERITY QUANTIFIED, NO PAIN PRESENT: ICD-10-PCS | Mod: S$GLB,,, | Performed by: PHYSICIAN ASSISTANT

## 2021-06-28 PROCEDURE — 99214 PR OFFICE/OUTPT VISIT, EST, LEVL IV, 30-39 MIN: ICD-10-PCS | Mod: S$GLB,,, | Performed by: PHYSICIAN ASSISTANT

## 2021-06-28 PROCEDURE — 99214 OFFICE O/P EST MOD 30 MIN: CPT | Mod: S$GLB,,, | Performed by: PHYSICIAN ASSISTANT

## 2021-06-28 PROCEDURE — 3008F PR BODY MASS INDEX (BMI) DOCUMENTED: ICD-10-PCS | Mod: CPTII,S$GLB,, | Performed by: PHYSICIAN ASSISTANT

## 2021-06-28 PROCEDURE — 3008F BODY MASS INDEX DOCD: CPT | Mod: CPTII,S$GLB,, | Performed by: PHYSICIAN ASSISTANT

## 2021-07-12 PROBLEM — M79.604 PAIN OF RIGHT LOWER EXTREMITY: Status: ACTIVE | Noted: 2021-07-12

## 2021-07-12 PROBLEM — E78.2 MIXED HYPERLIPIDEMIA: Status: ACTIVE | Noted: 2021-07-12

## 2021-07-12 PROBLEM — F32.0 CURRENT MILD EPISODE OF MAJOR DEPRESSIVE DISORDER WITHOUT PRIOR EPISODE: Status: ACTIVE | Noted: 2021-07-12

## 2021-07-26 NOTE — PROGRESS NOTES
Pt called requesting refill of nicotine patches, done  
[FreeTextEntry1] : Gen: Patient is A&O x 3, NAD\par HEENT: EOMI, hearing grossly normal\par Resp: regular, non - labored\par CV: pulses regular\par Skin: no rashes, erythema, +Submental mild fibrosis \par Lymph: +submental edema \par Inspection: left lateral scapula winging \par ROM: left shoulder FROM in abduction \par Palpation:no tenderness to palpation\par Sensation: intact to light touch\par Reflexes: 1+ and symmetric throughout\par Strength: 5/5 throughout\par Special tests: -Capps sign, -Spurling sign \par Gait: normal, non-antalgic\par \par

## 2021-07-28 ENCOUNTER — OFFICE VISIT (OUTPATIENT)
Dept: DERMATOLOGY | Facility: CLINIC | Age: 41
End: 2021-07-28
Payer: COMMERCIAL

## 2021-07-28 DIAGNOSIS — B07.0 PLANTAR WART: Primary | ICD-10-CM

## 2021-07-28 DIAGNOSIS — L50.9 HIVES: ICD-10-CM

## 2021-07-28 DIAGNOSIS — L72.0 EPIDERMAL CYST: ICD-10-CM

## 2021-07-28 DIAGNOSIS — R21 RASH: ICD-10-CM

## 2021-07-28 DIAGNOSIS — L29.9 ITCH: ICD-10-CM

## 2021-07-28 DIAGNOSIS — L82.1 SK (SEBORRHEIC KERATOSIS): ICD-10-CM

## 2021-07-28 PROCEDURE — 1159F MED LIST DOCD IN RCRD: CPT | Mod: CPTII,S$GLB,, | Performed by: DERMATOLOGY

## 2021-07-28 PROCEDURE — 17110 DESTRUCTION B9 LES UP TO 14: CPT | Mod: S$GLB,,, | Performed by: DERMATOLOGY

## 2021-07-28 PROCEDURE — 17110 PR DESTRUCTION BENIGN LESIONS UP TO 14: ICD-10-PCS | Mod: S$GLB,,, | Performed by: DERMATOLOGY

## 2021-07-28 PROCEDURE — 99204 PR OFFICE/OUTPT VISIT, NEW, LEVL IV, 45-59 MIN: ICD-10-PCS | Mod: 25,S$GLB,, | Performed by: DERMATOLOGY

## 2021-07-28 PROCEDURE — 1160F PR REVIEW ALL MEDS BY PRESCRIBER/CLIN PHARMACIST DOCUMENTED: ICD-10-PCS | Mod: CPTII,S$GLB,, | Performed by: DERMATOLOGY

## 2021-07-28 PROCEDURE — 1160F RVW MEDS BY RX/DR IN RCRD: CPT | Mod: CPTII,S$GLB,, | Performed by: DERMATOLOGY

## 2021-07-28 PROCEDURE — 99204 OFFICE O/P NEW MOD 45 MIN: CPT | Mod: 25,S$GLB,, | Performed by: DERMATOLOGY

## 2021-07-28 PROCEDURE — 99999 PR PBB SHADOW E&M-EST. PATIENT-LVL III: CPT | Mod: PBBFAC,,, | Performed by: DERMATOLOGY

## 2021-07-28 PROCEDURE — 99999 PR PBB SHADOW E&M-EST. PATIENT-LVL III: ICD-10-PCS | Mod: PBBFAC,,, | Performed by: DERMATOLOGY

## 2021-07-28 PROCEDURE — 1159F PR MEDICATION LIST DOCUMENTED IN MEDICAL RECORD: ICD-10-PCS | Mod: CPTII,S$GLB,, | Performed by: DERMATOLOGY

## 2021-07-28 PROCEDURE — 1125F PR PAIN SEVERITY QUANTIFIED, PAIN PRESENT: ICD-10-PCS | Mod: CPTII,S$GLB,, | Performed by: DERMATOLOGY

## 2021-07-28 PROCEDURE — 1125F AMNT PAIN NOTED PAIN PRSNT: CPT | Mod: CPTII,S$GLB,, | Performed by: DERMATOLOGY

## 2021-07-28 RX ORDER — CLOBETASOL PROPIONATE 0.5 MG/G
CREAM TOPICAL 2 TIMES DAILY
Qty: 45 G | Refills: 0 | Status: SHIPPED | OUTPATIENT
Start: 2021-07-28

## 2021-08-19 ENCOUNTER — TELEPHONE (OUTPATIENT)
Dept: OBSTETRICS AND GYNECOLOGY | Facility: CLINIC | Age: 41
End: 2021-08-19

## 2021-08-19 DIAGNOSIS — Z30.42 ENCOUNTER FOR SURVEILLANCE OF INJECTABLE CONTRACEPTIVE: Primary | ICD-10-CM

## 2021-08-19 DIAGNOSIS — Z30.42 ON DEPO-PROVERA FOR CONTRACEPTION: ICD-10-CM

## 2021-08-19 RX ORDER — MEDROXYPROGESTERONE ACETATE 150 MG/ML
150 INJECTION, SUSPENSION INTRAMUSCULAR
Qty: 1 SYRINGE | Refills: 3 | Status: SHIPPED | OUTPATIENT
Start: 2021-08-19 | End: 2024-01-25

## 2021-08-26 ENCOUNTER — CLINICAL SUPPORT (OUTPATIENT)
Dept: OBSTETRICS AND GYNECOLOGY | Facility: CLINIC | Age: 41
End: 2021-08-26
Payer: COMMERCIAL

## 2021-08-26 DIAGNOSIS — Z30.42 ON DEPO-PROVERA FOR CONTRACEPTION: Primary | ICD-10-CM

## 2021-08-26 PROCEDURE — 96372 THER/PROPH/DIAG INJ SC/IM: CPT | Mod: S$GLB,,, | Performed by: NURSE PRACTITIONER

## 2021-08-26 PROCEDURE — 99999 PR PBB SHADOW E&M-EST. PATIENT-LVL II: ICD-10-PCS | Mod: PBBFAC,,,

## 2021-08-26 PROCEDURE — 99999 PR PBB SHADOW E&M-EST. PATIENT-LVL II: CPT | Mod: PBBFAC,,,

## 2021-08-26 PROCEDURE — 96372 PR INJECTION,THERAP/PROPH/DIAG2ST, IM OR SUBCUT: ICD-10-PCS | Mod: S$GLB,,, | Performed by: NURSE PRACTITIONER

## 2021-08-26 RX ADMIN — MEDROXYPROGESTERONE ACETATE 150 MG: 150 INJECTION, SUSPENSION INTRAMUSCULAR at 01:08

## 2021-09-09 ENCOUNTER — TELEPHONE (OUTPATIENT)
Dept: PODIATRY | Facility: CLINIC | Age: 41
End: 2021-09-09

## 2021-10-28 ENCOUNTER — TELEPHONE (OUTPATIENT)
Dept: SMOKING CESSATION | Facility: CLINIC | Age: 41
End: 2021-10-28
Payer: COMMERCIAL

## 2021-11-18 ENCOUNTER — CLINICAL SUPPORT (OUTPATIENT)
Dept: OBSTETRICS AND GYNECOLOGY | Facility: CLINIC | Age: 41
End: 2021-11-18
Payer: COMMERCIAL

## 2021-11-18 PROCEDURE — 96372 THER/PROPH/DIAG INJ SC/IM: CPT | Mod: S$GLB,,, | Performed by: NURSE PRACTITIONER

## 2021-11-18 PROCEDURE — 96372 PR INJECTION,THERAP/PROPH/DIAG2ST, IM OR SUBCUT: ICD-10-PCS | Mod: S$GLB,,, | Performed by: NURSE PRACTITIONER

## 2021-11-18 RX ADMIN — MEDROXYPROGESTERONE ACETATE 150 MG: 150 INJECTION, SUSPENSION INTRAMUSCULAR at 02:11

## 2021-11-22 ENCOUNTER — HOSPITAL ENCOUNTER (OUTPATIENT)
Dept: RADIOLOGY | Facility: HOSPITAL | Age: 41
Discharge: HOME | End: 2021-11-22
Attending: PHYSICIAN ASSISTANT
Payer: COMMERCIAL

## 2021-11-22 DIAGNOSIS — R92.30 DENSE BREASTS: ICD-10-CM

## 2021-11-22 DIAGNOSIS — Z91.89 AT HIGH RISK FOR BREAST CANCER: ICD-10-CM

## 2021-11-22 DIAGNOSIS — Z91.89 INCREASED RISK OF BREAST CANCER: ICD-10-CM

## 2021-11-22 DIAGNOSIS — Z12.39 BREAST CANCER SCREENING, HIGH RISK PATIENT: ICD-10-CM

## 2021-11-22 DIAGNOSIS — Z80.3 FAMILY HISTORY OF BREAST CANCER: ICD-10-CM

## 2021-11-22 PROCEDURE — 77049 MRI BREAST W/WO CONTRAST, W/CAD, BILATERAL: ICD-10-PCS | Mod: 26,,, | Performed by: RADIOLOGY

## 2021-11-22 PROCEDURE — 25500020 PHARM REV CODE 255: Performed by: PHYSICIAN ASSISTANT

## 2021-11-22 PROCEDURE — 77049 MRI BREAST C-+ W/CAD BI: CPT | Mod: TC

## 2021-11-22 PROCEDURE — A9577 INJ MULTIHANCE: HCPCS | Performed by: PHYSICIAN ASSISTANT

## 2021-11-22 PROCEDURE — 77049 MRI BREAST C-+ W/CAD BI: CPT | Mod: 26,,, | Performed by: RADIOLOGY

## 2021-11-22 RX ADMIN — GADOBENATE DIMEGLUMINE 13 ML: 529 INJECTION, SOLUTION INTRAVENOUS at 01:11

## 2021-11-23 ENCOUNTER — TELEPHONE (OUTPATIENT)
Dept: RADIOLOGY | Facility: HOSPITAL | Age: 41
End: 2021-11-23
Payer: COMMERCIAL

## 2021-12-20 ENCOUNTER — HOSPITAL ENCOUNTER (OUTPATIENT)
Dept: RADIOLOGY | Facility: HOSPITAL | Age: 41
Discharge: HOME | End: 2021-12-20
Attending: PHYSICIAN ASSISTANT
Payer: COMMERCIAL

## 2021-12-20 DIAGNOSIS — R92.8 ABNORMAL FINDING ON BREAST IMAGING: ICD-10-CM

## 2021-12-20 PROCEDURE — A9577 INJ MULTIHANCE: HCPCS | Performed by: PHYSICIAN ASSISTANT

## 2021-12-20 PROCEDURE — 77065 DX MAMMO INCL CAD UNI: CPT | Mod: TC,LT

## 2021-12-20 PROCEDURE — 25500020 PHARM REV CODE 255: Performed by: PHYSICIAN ASSISTANT

## 2021-12-20 PROCEDURE — 88342 IMHCHEM/IMCYTCHM 1ST ANTB: CPT | Performed by: PATHOLOGY

## 2021-12-20 PROCEDURE — 88305 TISSUE EXAM BY PATHOLOGIST: CPT | Performed by: PATHOLOGY

## 2021-12-20 PROCEDURE — 77065 MAMMO DIGITAL DIAGNOSTIC LEFT: ICD-10-PCS | Mod: 26,LT,, | Performed by: RADIOLOGY

## 2021-12-20 PROCEDURE — 88342 CHG IMMUNOCYTOCHEMISTRY: ICD-10-PCS | Mod: 26,,, | Performed by: PATHOLOGY

## 2021-12-20 PROCEDURE — 25000003 PHARM REV CODE 250: Performed by: PHYSICIAN ASSISTANT

## 2021-12-20 PROCEDURE — 88305 TISSUE EXAM BY PATHOLOGIST: CPT | Mod: 26,,, | Performed by: PATHOLOGY

## 2021-12-20 PROCEDURE — 19085 BX BREAST 1ST LESION MR IMAG: CPT | Mod: ,,, | Performed by: RADIOLOGY

## 2021-12-20 PROCEDURE — 27200939 MRI BREAST BIOPSY WITH IMAGING 1ST SITE

## 2021-12-20 PROCEDURE — 19085 MRI BREAST BIOPSY WITH IMAGING 1ST SITE: ICD-10-PCS | Mod: ,,, | Performed by: RADIOLOGY

## 2021-12-20 PROCEDURE — 77065 DX MAMMO INCL CAD UNI: CPT | Mod: 26,LT,, | Performed by: RADIOLOGY

## 2021-12-20 PROCEDURE — 88305 TISSUE EXAM BY PATHOLOGIST: ICD-10-PCS | Mod: 26,,, | Performed by: PATHOLOGY

## 2021-12-20 PROCEDURE — 88342 IMHCHEM/IMCYTCHM 1ST ANTB: CPT | Mod: 26,,, | Performed by: PATHOLOGY

## 2021-12-20 RX ORDER — LIDOCAINE HYDROCHLORIDE 10 MG/ML
2 INJECTION INFILTRATION; PERINEURAL ONCE
Status: COMPLETED | OUTPATIENT
Start: 2021-12-20 | End: 2021-12-20

## 2021-12-20 RX ORDER — LIDOCAINE HYDROCHLORIDE AND EPINEPHRINE 20; 10 MG/ML; UG/ML
19 INJECTION, SOLUTION INFILTRATION; PERINEURAL ONCE
Status: COMPLETED | OUTPATIENT
Start: 2021-12-20 | End: 2021-12-20

## 2021-12-20 RX ADMIN — LIDOCAINE HYDROCHLORIDE 2 ML: 10 INJECTION, SOLUTION EPIDURAL; INFILTRATION; INTRACAUDAL; PERINEURAL at 08:12

## 2021-12-20 RX ADMIN — LIDOCAINE HYDROCHLORIDE AND EPINEPHRINE 19 ML: 20; 10 INJECTION, SOLUTION INFILTRATION; PERINEURAL at 08:12

## 2021-12-20 RX ADMIN — GADOBENATE DIMEGLUMINE 12 ML: 529 INJECTION, SOLUTION INTRAVENOUS at 07:12

## 2021-12-22 ENCOUNTER — TELEPHONE (OUTPATIENT)
Dept: HEMATOLOGY/ONCOLOGY | Facility: CLINIC | Age: 41
End: 2021-12-22
Payer: COMMERCIAL

## 2021-12-22 LAB
COMMENT: NORMAL
FINAL PATHOLOGIC DIAGNOSIS: NORMAL
GROSS: NORMAL
Lab: NORMAL

## 2021-12-23 ENCOUNTER — OFFICE VISIT (OUTPATIENT)
Dept: OBSTETRICS AND GYNECOLOGY | Facility: CLINIC | Age: 41
End: 2021-12-23
Payer: COMMERCIAL

## 2021-12-23 VITALS
DIASTOLIC BLOOD PRESSURE: 60 MMHG | SYSTOLIC BLOOD PRESSURE: 124 MMHG | WEIGHT: 130.63 LBS | BODY MASS INDEX: 24.68 KG/M2

## 2021-12-23 DIAGNOSIS — K62.89 ANAL IRRITATION: Primary | ICD-10-CM

## 2021-12-23 PROCEDURE — 99999 PR PBB SHADOW E&M-EST. PATIENT-LVL III: ICD-10-PCS | Mod: PBBFAC,,, | Performed by: NURSE PRACTITIONER

## 2021-12-23 PROCEDURE — 3008F BODY MASS INDEX DOCD: CPT | Mod: CPTII,S$GLB,, | Performed by: NURSE PRACTITIONER

## 2021-12-23 PROCEDURE — 99212 PR OFFICE/OUTPT VISIT, EST, LEVL II, 10-19 MIN: ICD-10-PCS | Mod: S$GLB,,, | Performed by: NURSE PRACTITIONER

## 2021-12-23 PROCEDURE — 1160F PR REVIEW ALL MEDS BY PRESCRIBER/CLIN PHARMACIST DOCUMENTED: ICD-10-PCS | Mod: CPTII,S$GLB,, | Performed by: NURSE PRACTITIONER

## 2021-12-23 PROCEDURE — 3074F PR MOST RECENT SYSTOLIC BLOOD PRESSURE < 130 MM HG: ICD-10-PCS | Mod: CPTII,S$GLB,, | Performed by: NURSE PRACTITIONER

## 2021-12-23 PROCEDURE — 3044F PR MOST RECENT HEMOGLOBIN A1C LEVEL <7.0%: ICD-10-PCS | Mod: CPTII,S$GLB,, | Performed by: NURSE PRACTITIONER

## 2021-12-23 PROCEDURE — 99212 OFFICE O/P EST SF 10 MIN: CPT | Mod: S$GLB,,, | Performed by: NURSE PRACTITIONER

## 2021-12-23 PROCEDURE — 3078F PR MOST RECENT DIASTOLIC BLOOD PRESSURE < 80 MM HG: ICD-10-PCS | Mod: CPTII,S$GLB,, | Performed by: NURSE PRACTITIONER

## 2021-12-23 PROCEDURE — 3074F SYST BP LT 130 MM HG: CPT | Mod: CPTII,S$GLB,, | Performed by: NURSE PRACTITIONER

## 2021-12-23 PROCEDURE — 1160F RVW MEDS BY RX/DR IN RCRD: CPT | Mod: CPTII,S$GLB,, | Performed by: NURSE PRACTITIONER

## 2021-12-23 PROCEDURE — 99999 PR PBB SHADOW E&M-EST. PATIENT-LVL III: CPT | Mod: PBBFAC,,, | Performed by: NURSE PRACTITIONER

## 2021-12-23 PROCEDURE — 1159F MED LIST DOCD IN RCRD: CPT | Mod: CPTII,S$GLB,, | Performed by: NURSE PRACTITIONER

## 2021-12-23 PROCEDURE — 3078F DIAST BP <80 MM HG: CPT | Mod: CPTII,S$GLB,, | Performed by: NURSE PRACTITIONER

## 2021-12-23 PROCEDURE — 3008F PR BODY MASS INDEX (BMI) DOCUMENTED: ICD-10-PCS | Mod: CPTII,S$GLB,, | Performed by: NURSE PRACTITIONER

## 2021-12-23 PROCEDURE — 3044F HG A1C LEVEL LT 7.0%: CPT | Mod: CPTII,S$GLB,, | Performed by: NURSE PRACTITIONER

## 2021-12-23 PROCEDURE — 1159F PR MEDICATION LIST DOCUMENTED IN MEDICAL RECORD: ICD-10-PCS | Mod: CPTII,S$GLB,, | Performed by: NURSE PRACTITIONER

## 2021-12-27 ENCOUNTER — OFFICE VISIT (OUTPATIENT)
Dept: SURGERY | Facility: CLINIC | Age: 41
End: 2021-12-27
Payer: COMMERCIAL

## 2021-12-27 VITALS
BODY MASS INDEX: 24.45 KG/M2 | DIASTOLIC BLOOD PRESSURE: 59 MMHG | HEIGHT: 61 IN | SYSTOLIC BLOOD PRESSURE: 102 MMHG | TEMPERATURE: 98 F | WEIGHT: 129.5 LBS | HEART RATE: 101 BPM

## 2021-12-27 DIAGNOSIS — R92.8 ABNORMAL MAMMOGRAM: Primary | ICD-10-CM

## 2021-12-27 PROCEDURE — 3074F PR MOST RECENT SYSTOLIC BLOOD PRESSURE < 130 MM HG: ICD-10-PCS | Mod: CPTII,S$GLB,, | Performed by: SURGERY

## 2021-12-27 PROCEDURE — 3074F SYST BP LT 130 MM HG: CPT | Mod: CPTII,S$GLB,, | Performed by: SURGERY

## 2021-12-27 PROCEDURE — 99999 PR PBB SHADOW E&M-EST. PATIENT-LVL III: ICD-10-PCS | Mod: PBBFAC,,, | Performed by: SURGERY

## 2021-12-27 PROCEDURE — 3078F DIAST BP <80 MM HG: CPT | Mod: CPTII,S$GLB,, | Performed by: SURGERY

## 2021-12-27 PROCEDURE — 99999 PR PBB SHADOW E&M-EST. PATIENT-LVL III: CPT | Mod: PBBFAC,,, | Performed by: SURGERY

## 2021-12-27 PROCEDURE — 3078F PR MOST RECENT DIASTOLIC BLOOD PRESSURE < 80 MM HG: ICD-10-PCS | Mod: CPTII,S$GLB,, | Performed by: SURGERY

## 2021-12-27 PROCEDURE — 99203 PR OFFICE/OUTPT VISIT, NEW, LEVL III, 30-44 MIN: ICD-10-PCS | Mod: S$GLB,,, | Performed by: SURGERY

## 2021-12-27 PROCEDURE — 3008F BODY MASS INDEX DOCD: CPT | Mod: CPTII,S$GLB,, | Performed by: SURGERY

## 2021-12-27 PROCEDURE — 3008F PR BODY MASS INDEX (BMI) DOCUMENTED: ICD-10-PCS | Mod: CPTII,S$GLB,, | Performed by: SURGERY

## 2021-12-27 PROCEDURE — 99203 OFFICE O/P NEW LOW 30 MIN: CPT | Mod: S$GLB,,, | Performed by: SURGERY

## 2021-12-27 RX ORDER — ERGOCALCIFEROL 1.25 MG/1
50000 CAPSULE ORAL
COMMUNITY
Start: 2021-07-13

## 2021-12-27 RX ORDER — HYDROCODONE BITARTRATE AND ACETAMINOPHEN 5; 325 MG/1; MG/1
TABLET ORAL
COMMUNITY
End: 2022-07-19

## 2021-12-27 NOTE — PROGRESS NOTES
Breast Surgery  [facility]  Department of Surgery      REFERRING PROVIDER: Yaima Haas MD  [address] [place] [address]    Chief Complaint: No chief complaint on file.      Subjective:      Patient ID: iTta Campos is a 41 y.o. 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     She has a history of a right breast excisional biopsy for fibroadenoma in  (in [place]). Has been getting yearly screening mammograms since that time. Started high risk screening in summer 2021 due to elevated TC score of 29%. Had her first screening MRI over  break, 21.      MRI 21 showed an 11 mm x 5 mm heterogeneous, non-mass enhancement in a linear distribution seen in the left breast at 6 o'clock in the middle depth, 3 cm from the nipple. An MRI guided biopsy was performed on 21 with pathology revealing:    Final Pathologic Diagnosis   Date/Time Value Ref Range Status   2021 08:15 AM   Final    1. Left breast non mass enhancement (6 o'clock position), biopsy:      -  Benign breast tissue with sclerosing intraductal papilloma with  apocrine metaplasia and usual duct         hyperplasia (UDH) and associated duct ectasia      -  Negative for atypia or malignancy       Comment:     Interp By Vilma Bangura M.D., Signed on 2021 at 10:00     She was asymptomatic prior to this biopsy; no palpable masses or nipple discharge. No breast changes.     Patient does routinely do self breast exams.  Patient has not noted a change on breast exam.  Patient denies nipple discharge. Patient admits to to previous breast biopsy. Patient denies a personal history of breast cancer.      GYN History:  Age of menarche was 13. Perimenopausal?  Last menstrual period was two years ago. Patient denies hormonal therapy. Patient continues to take depot provera for menorrhagia, so unclear if truly perimenopausal. Patient is . Age of  first live birth was 23. Patient did breast feed.    Past Medical History:   Diagnosis Date    Basal cell carcinoma     Cancer     skin cancer , basal cell CA     Cancer     cervical cancer    Fibrocystic breast     Melanoma 1990's     Past Surgical History:   Procedure Laterality Date    BREAST BIOPSY Right     benign    BREAST SURGERY      benign nodules right breast     CERVICAL BIOPSY  W/ LOOP ELECTRODE EXCISION      SKIN CANCER EXCISION      WRIST SURGERY Left 04/2017    several small soft tissues masses     Current Outpatient Medications on File Prior to Visit   Medication Sig Dispense Refill    CHANTIX 1 mg Tab TAKE 1 TABLET(1 MG) BY MOUTH TWICE DAILY 30 tablet 0    clobetasoL (TEMOVATE) 0.05 % cream Apply topically 2 (two) times daily. Prn itch.  Stop using steroid topical when skin is smooth and non itchy.  Do not treat dark or red coloring. 45 g 0    gabapentin (NEURONTIN) 100 MG capsule TK ONE C PO  QD UTD  1    medroxyPROGESTERone (DEPO-PROVERA) 150 mg/mL Syrg Inject 1 mL (150 mg total) into the muscle every 3 (three) months. 1 Syringe 3    nicotine (NICODERM CQ) 21 mg/24 hr unwrap and apply  1 patch onto the skin once daily. 28 patch 0    simvastatin (ZOCOR) 10 MG tablet Take 1 tablet (10 mg total) by mouth every evening. 90 tablet 0    traZODone (DESYREL) 50 MG tablet Take 2 tablets (100 mg total) by mouth every evening. 180 tablet 2     Current Facility-Administered Medications on File Prior to Visit   Medication Dose Route Frequency Provider Last Rate Last Admin    medroxyPROGESTERone (DEPO-PROVERA) injection 150 mg  150 mg Intramuscular Q90 Days Elio Iglesias MD   150 mg at 11/18/21 1413     Social History     Socioeconomic History    Marital status:    Occupational History    Occupation: teacher   Tobacco Use    Smoking status: Current Every Day Smoker     Packs/day: 0.40     Types: Cigarettes    Smokeless tobacco: Never Used    Tobacco comment: pt on chantix    Substance and Sexual Activity    Alcohol use: Yes    Drug use: No    Sexual activity: Yes     Partners: Male     Birth control/protection: Injection     Family History   Problem Relation Age of Onset    Cancer Mother         breast     Breast cancer Mother     Cancer Father         melanoma    Melanoma Father     Cancer Paternal Grandmother     Breast cancer Paternal Grandmother     Cancer Paternal Grandfather     Cancer Sister         breast    Breast cancer Sister     Colon cancer Maternal Grandmother     Ovarian cancer Neg Hx         Review of Systems   Constitutional: Negative for activity change, chills and fever.   Respiratory: Negative for chest tightness, shortness of breath and wheezing.    Cardiovascular: Negative for chest pain and palpitations.   Gastrointestinal: Negative for abdominal distention, abdominal pain, constipation, diarrhea, nausea and vomiting.   Genitourinary: Negative for difficulty urinating.   Skin: Negative for color change and wound.   Neurological: Negative for light-headedness.   Hematological: Does not bruise/bleed easily.     Objective:   There were no vitals taken for this visit.    Physical Exam   Pulmonary/Chest: Right breast exhibits no inverted nipple, no mass, no nipple discharge, no skin change and no tenderness.           Radiology review: Images personally reviewed by me in the clinic.        Mammo Digital Diagnostic Left    Result Date: 12/20/2021  MRI Guided Biopsy, LEFT Breast Non mass enhancement at 6 o'clock. Hourglass marker. Patient gave and signed informed consent. A time out was performed. Technique: Axial T1 fat suppressed MR images without and with contrast. Contrast: 12 cc Multihance IV. Sterile technique: Betadine. Anesthesia: Local lidocaine without and with epinephrine. Device: 9-gauge Suros Atec, regular. Approach: Lateral 6 samples. All samples placed in formalin. No complications. Post-biopsy mammogram shows the marker in expected  position.      Successful MRI guided biopsy of left breast non mass enhancement.     MRI Breast Biopsy with Imaging 1st Site    Result Date: 12/20/2021  MRI Guided Biopsy, LEFT Breast Non mass enhancement at 6 o'clock. Hourglass marker. Patient gave and signed informed consent. A time out was performed. Technique: Axial T1 fat suppressed MR images without and with contrast. Contrast: 12 cc Multihance IV. Sterile technique: Betadine. Anesthesia: Local lidocaine without and with epinephrine. Device: 9-gauge Suros Atec, regular. Approach: Lateral 6 samples. All samples placed in formalin. No complications. Post-biopsy mammogram shows the marker in expected position.      Successful MRI guided biopsy of left breast non mass enhancement.         Assessment:   High risk for breast cancer (TC 29%) with left breast concordant biopsy for intraductal papilloma.   Plan:     - Intraductal papilloma is a benign lesion and the imaging/biopsy was concordant. Options of excision vs. Surveillance w imaging reviewed.   - continue self breast exams  - Continue high risk screening with Lizbeth Delgado PA-C. Next appt and screening mammogram in June 2022

## 2022-01-03 ENCOUNTER — TELEPHONE (OUTPATIENT)
Dept: OBSTETRICS AND GYNECOLOGY | Facility: CLINIC | Age: 42
End: 2022-01-03
Payer: COMMERCIAL

## 2022-01-26 ENCOUNTER — CLINICAL SUPPORT (OUTPATIENT)
Dept: SMOKING CESSATION | Facility: CLINIC | Age: 42
End: 2022-01-26
Payer: COMMERCIAL

## 2022-01-26 DIAGNOSIS — F17.200 NICOTINE DEPENDENCE: Primary | ICD-10-CM

## 2022-01-26 PROCEDURE — 99407 BEHAV CHNG SMOKING > 10 MIN: CPT | Mod: S$GLB,,,

## 2022-01-26 PROCEDURE — 99407 PR TOBACCO USE CESSATION INTENSIVE >10 MINUTES: ICD-10-PCS | Mod: S$GLB,,,

## 2022-01-26 NOTE — PROGRESS NOTES
Spoke with patient today in regard to smoking cessation progress for 6 month phone follow up on quit 4. Patient not tobacco free at this time. Patient has scheduled an appointment to return to the program for Quit attempt #5. Informed patient of benefit period, future follow ups, and contact information if any further help or support is needed. Will complete 3 and 6 month smart form for Quit attempt #4.

## 2022-02-07 ENCOUNTER — CLINICAL SUPPORT (OUTPATIENT)
Dept: SMOKING CESSATION | Facility: CLINIC | Age: 42
End: 2022-02-07
Payer: COMMERCIAL

## 2022-02-07 DIAGNOSIS — F17.200 NICOTINE DEPENDENCE: ICD-10-CM

## 2022-02-07 PROCEDURE — 99404 PR PREVENT COUNSEL,INDIV,60 MIN: ICD-10-PCS | Mod: S$GLB,,,

## 2022-02-07 PROCEDURE — 99404 PREV MED CNSL INDIV APPRX 60: CPT | Mod: S$GLB,,,

## 2022-02-07 RX ORDER — IBUPROFEN 200 MG
1 TABLET ORAL DAILY
Qty: 28 PATCH | Refills: 0 | Status: SHIPPED | OUTPATIENT
Start: 2022-02-07

## 2022-02-07 RX ORDER — DIPHENHYDRAMINE HCL 25 MG
4 CAPSULE ORAL
Qty: 50 EACH | Refills: 0 | Status: SHIPPED | OUTPATIENT
Start: 2022-02-07 | End: 2022-07-19

## 2022-02-07 NOTE — PROGRESS NOTES
See Tobacco Cessation Intake Form for patient assessment and recommendations.  Exhaled carbon monoxide level was 23 ppm per Smokerlyzer.

## 2022-02-07 NOTE — Clinical Note
Pt seen at intake today. She currently smokes 30 cigs/day. Discussed tobacco cessation medication of 21 mg nicotine patch QD and 4 mg nicotine lozenge PRN (1-2 per hour in place of cigarettes). Pt started on rate reduction and wait time of 15 min prior to smoking. Exhaled carbon monoxide level was 23 (0-6 non-smoker). Will see pt back in office in 1 wk.

## 2022-02-14 ENCOUNTER — CLINICAL SUPPORT (OUTPATIENT)
Dept: SMOKING CESSATION | Facility: CLINIC | Age: 42
End: 2022-02-14
Payer: COMMERCIAL

## 2022-02-14 DIAGNOSIS — F17.200 NICOTINE DEPENDENCE: ICD-10-CM

## 2022-02-14 PROCEDURE — 99402 PREV MED CNSL INDIV APPRX 30: CPT | Mod: S$GLB,,,

## 2022-02-14 PROCEDURE — 99402 PR PREVENT COUNSEL,INDIV,30 MIN: ICD-10-PCS | Mod: S$GLB,,,

## 2022-02-14 NOTE — Clinical Note
Pt seen in office today. She continues to smoke 18 cigs/day. Pt remains on tobacco cessation medication of 21 mg nicotine patch QD and 2 mg nicotine lozenge PRN (1-2 per hour in place of cigarettes). No adverse effects/mental changes noted at this time. Pt asked to reduce current smoking rate by 3 cigs/day. Reviewed coping strategies/stress management/habitual behavior with patient. Exhaled carbon monoxide level was 14 ppm per Smokerlyzer (0-6 non-smoker). Will see pt back in office in 1 wk.

## 2022-02-14 NOTE — PROGRESS NOTES
Individual Follow-Up Form    2/14/2022    Clinical Status of Patient: Outpatient    Length of Service: 30 minutes    Continuing Medication: yes  Patches    Other Medications: none     Target Symptoms: Withdrawal and medication side effects. The following were  rated moderate (3) to severe (4) on TCRS:  · Moderate (3): desire/crave tobacco  · Severe (4): none    Comments:  Pt seen in office today. She continues to smoke 18 cigs/day. Pt remains on tobacco cessation medication of 21 mg nicotine patch QD and 2 mg nicotine lozenge PRN (1-2 per hour in place of cigarettes). No adverse effects/mental changes noted at this time. Pt asked to reduce current smoking rate by 3 cigs/day. Reviewed coping strategies/stress management/habitual behavior with patient. Exhaled carbon monoxide level was 14 ppm per Smokerlyzer (0-6 non-smoker). Will see pt back in office in 1 wk.     Diagnosis: F17.200    Next Visit: 1 week

## 2022-05-18 ENCOUNTER — TELEPHONE (OUTPATIENT)
Dept: SMOKING CESSATION | Facility: CLINIC | Age: 42
End: 2022-05-18
Payer: COMMERCIAL

## 2022-05-18 NOTE — TELEPHONE ENCOUNTER
1st attempt, patient called in regards to 12 month f/u for quit 4 with Smoking Cessation.  Voicemail with contact information given.  Quit 4 completed and resolved.  Quit 5 updated with correct quit #.

## 2022-07-12 ENCOUNTER — PATIENT MESSAGE (OUTPATIENT)
Dept: OBSTETRICS AND GYNECOLOGY | Facility: CLINIC | Age: 42
End: 2022-07-12
Payer: COMMERCIAL

## 2022-07-14 DIAGNOSIS — Z12.31 ENCOUNTER FOR SCREENING MAMMOGRAM FOR MALIGNANT NEOPLASM OF BREAST: Primary | ICD-10-CM

## 2022-07-19 ENCOUNTER — OFFICE VISIT (OUTPATIENT)
Dept: OBSTETRICS AND GYNECOLOGY | Facility: CLINIC | Age: 42
End: 2022-07-19
Payer: COMMERCIAL

## 2022-07-19 ENCOUNTER — OFFICE VISIT (OUTPATIENT)
Dept: SURGERY | Facility: CLINIC | Age: 42
End: 2022-07-19
Payer: COMMERCIAL

## 2022-07-19 VITALS
BODY MASS INDEX: 23.43 KG/M2 | DIASTOLIC BLOOD PRESSURE: 58 MMHG | WEIGHT: 124.13 LBS | SYSTOLIC BLOOD PRESSURE: 88 MMHG | HEIGHT: 61 IN

## 2022-07-19 VITALS
SYSTOLIC BLOOD PRESSURE: 106 MMHG | WEIGHT: 125.44 LBS | BODY MASS INDEX: 23.68 KG/M2 | DIASTOLIC BLOOD PRESSURE: 66 MMHG | HEART RATE: 108 BPM | HEIGHT: 61 IN

## 2022-07-19 DIAGNOSIS — K64.4 EXTERNAL HEMORRHOIDS: Primary | ICD-10-CM

## 2022-07-19 DIAGNOSIS — K62.89 ANAL IRRITATION: ICD-10-CM

## 2022-07-19 DIAGNOSIS — Z01.419 ENCOUNTER FOR WELL WOMAN EXAM WITH ROUTINE GYNECOLOGICAL EXAM: Primary | ICD-10-CM

## 2022-07-19 PROCEDURE — 87624 HPV HI-RISK TYP POOLED RSLT: CPT | Performed by: NURSE PRACTITIONER

## 2022-07-19 PROCEDURE — 3078F DIAST BP <80 MM HG: CPT | Mod: CPTII,S$GLB,, | Performed by: NURSE PRACTITIONER

## 2022-07-19 PROCEDURE — 3078F PR MOST RECENT DIASTOLIC BLOOD PRESSURE < 80 MM HG: ICD-10-PCS | Mod: CPTII,S$GLB,, | Performed by: NURSE PRACTITIONER

## 2022-07-19 PROCEDURE — 3074F PR MOST RECENT SYSTOLIC BLOOD PRESSURE < 130 MM HG: ICD-10-PCS | Mod: CPTII,S$GLB,, | Performed by: NURSE PRACTITIONER

## 2022-07-19 PROCEDURE — 99999 PR PBB SHADOW E&M-EST. PATIENT-LVL III: CPT | Mod: PBBFAC,,, | Performed by: NURSE PRACTITIONER

## 2022-07-19 PROCEDURE — 1159F PR MEDICATION LIST DOCUMENTED IN MEDICAL RECORD: ICD-10-PCS | Mod: CPTII,S$GLB,, | Performed by: NURSE PRACTITIONER

## 2022-07-19 PROCEDURE — 99396 PR PREVENTIVE VISIT,EST,40-64: ICD-10-PCS | Mod: S$GLB,,, | Performed by: NURSE PRACTITIONER

## 2022-07-19 PROCEDURE — 99203 OFFICE O/P NEW LOW 30 MIN: CPT | Mod: S$GLB,,, | Performed by: NURSE PRACTITIONER

## 2022-07-19 PROCEDURE — 1159F MED LIST DOCD IN RCRD: CPT | Mod: CPTII,S$GLB,, | Performed by: NURSE PRACTITIONER

## 2022-07-19 PROCEDURE — 3008F BODY MASS INDEX DOCD: CPT | Mod: CPTII,S$GLB,, | Performed by: NURSE PRACTITIONER

## 2022-07-19 PROCEDURE — 99396 PREV VISIT EST AGE 40-64: CPT | Mod: S$GLB,,, | Performed by: NURSE PRACTITIONER

## 2022-07-19 PROCEDURE — 99999 PR PBB SHADOW E&M-EST. PATIENT-LVL III: ICD-10-PCS | Mod: PBBFAC,,, | Performed by: NURSE PRACTITIONER

## 2022-07-19 PROCEDURE — 1160F RVW MEDS BY RX/DR IN RCRD: CPT | Mod: CPTII,S$GLB,, | Performed by: NURSE PRACTITIONER

## 2022-07-19 PROCEDURE — 3074F SYST BP LT 130 MM HG: CPT | Mod: CPTII,S$GLB,, | Performed by: NURSE PRACTITIONER

## 2022-07-19 PROCEDURE — 3008F PR BODY MASS INDEX (BMI) DOCUMENTED: ICD-10-PCS | Mod: CPTII,S$GLB,, | Performed by: NURSE PRACTITIONER

## 2022-07-19 PROCEDURE — 99203 PR OFFICE/OUTPT VISIT, NEW, LEVL III, 30-44 MIN: ICD-10-PCS | Mod: S$GLB,,, | Performed by: NURSE PRACTITIONER

## 2022-07-19 PROCEDURE — 88175 CYTOPATH C/V AUTO FLUID REDO: CPT | Performed by: NURSE PRACTITIONER

## 2022-07-19 PROCEDURE — 1160F PR REVIEW ALL MEDS BY PRESCRIBER/CLIN PHARMACIST DOCUMENTED: ICD-10-PCS | Mod: CPTII,S$GLB,, | Performed by: NURSE PRACTITIONER

## 2022-07-19 RX ORDER — HYDROCORTISONE 25 MG/G
CREAM TOPICAL 2 TIMES DAILY
Qty: 28 G | Refills: 2 | Status: SHIPPED | OUTPATIENT
Start: 2022-07-19 | End: 2024-01-25

## 2022-07-19 NOTE — PROGRESS NOTES
"Chief Complaint: Well Woman Exam     HPI:      Tita Campos is a 42 y.o.  who presents today for well woman exam.    Patient is not currently sexually active. She is currently using abstinence for contraception. She declines STD screening today. Ms. Campos confirms that she is safe at home.  Ms. Campos denies abnormal vaginal bleeding, discharge, pelvic pain, urinary problems, or changes in appetite.  Menses: irregular. Reports last depo injection 2022. First and only menses since starting depoprovera 2022    Previous Pap:NILM/ HPV negative ()  ASCUS with POSITIVE high risk HPV in 2019-- colpo 2019 negative for lesions of malignancy.  h/o LEEP in   Previous Mammogram: BiRads: 1 T-C Score: 26.73% (2021) Referred to [facility] for elevated TC score. She had an MRI and Biopsy in  that found a papilloma of left breast. Screening mammogram due now and repeat MRI in     Gardasil:has never had     Family History   Problem Relation Age of Onset    Cancer Mother         breast     Breast cancer Mother     Cancer Father         melanoma    Melanoma Father     Cancer Paternal Grandmother     Breast cancer Paternal Grandmother     Cancer Paternal Grandfather     Cancer Sister         breast    Breast cancer Sister     Colon cancer Maternal Grandmother     Ovarian cancer Neg Hx      OB History        6    Para   3    Term   3            AB   3    Living           SAB   3    IAB        Ectopic        Multiple        Live Births                     ROS:     GENERAL: Denies unintentional weight gain or weight loss. Feeling well overall.   BREASTS: Denies pain, lumps, or nipple discharge.   ABDOMEN: Denies abdominal pain, constipation, diarrhea, nausea, vomiting, change in appetite.  URINARY: Denies frequency, dysuria, hematuria.  PSYCHIATRIC: Denies depression, anxiety or mood swings.    Physical Exam:      PHYSICAL EXAM:  BP (!) 88/58   Ht 5' 1" (1.549 m)   Wt 56.3 kg " (124 lb 1.9 oz)   LMP 06/01/2022 (Approximate)   BMI 23.45 kg/m²   Body mass index is 23.45 kg/m².     APPEARANCE: Well nourished, well developed, in no acute distress.  PSYCH: Appropriate mood and affect.  ABDOMEN: Soft.  No tenderness or masses.    BREASTS: Symmetrical, no skin changes or visible lesions.  No palpable masses or nipple discharge bilaterally.  PELVIC: Normal external genitalia without lesions.  Normal hair distribution.  Adequate perineal body, normal urethral meatus.  Vagina moist and well rugated without lesions or discharge.  Cervix pink, without lesions, discharge or tenderness.  No significant cystocele or rectocele.  Bimanual exam shows uterus to be normal size, regular, mobile and nontender.  Adnexa without masses or tenderness.      Assessment/Plan:     Encounter for well woman exam with routine gynecological exam  -     Cancel: Liquid-Based Pap Smear, Screening  -     Cancel: HPV High Risk Genotypes, PCR    Other orders  -     HPV DNA, High Risk with Reflex to Genotype 16,18  -     Pap Smear, Thin Prep      Counseling:     Patient was counseled today on current ASCCP pap guidelines, the recommendation for yearly pelvic exams, healthy diet and exercise routines, breast self awareness and annual mammograms.She is to see her PCP for other health maintenance.

## 2022-07-19 NOTE — PROGRESS NOTES
CRS Office Visit History and Physical    Referring Md:   Chanelle Hernandez, Np-c  [address] [address]    SUBJECTIVE:     Chief Complaint: anal irritation    History of Present Illness:  The patient is new patient to this practice.   Course is as follows:  Patient is a 42 y.o. female presents with anal irritation.  Symptoms have been present for 11 yrs. Worsening. Tag, +difficulty cleaning. Feels is getting larger.  Has tried wet wipes for cleaning.  Associated bleeding: no  Previous anorectal procedures: no  denies straining/prolonged time on toilet with bowel movements.  is not currently taking fiber supplement or stool softener  Blood thinners: No    Last Colonoscopy: none  Family history of colorectal cancer or IBD: maternal grandmother dx in mid 90's.    Review of patient's allergies indicates:  No Known Allergies    Past Medical History:   Diagnosis Date    Basal cell carcinoma     Cancer     skin cancer , basal cell CA     Cancer     cervical cancer    Fibrocystic breast     Melanoma 1990's     Past Surgical History:   Procedure Laterality Date    BREAST BIOPSY Right     benign    BREAST SURGERY      benign nodules right breast     CERVICAL BIOPSY  W/ LOOP ELECTRODE EXCISION      SKIN CANCER EXCISION      WRIST SURGERY Left 04/2017    several small soft tissues masses     Family History   Problem Relation Age of Onset    Cancer Mother         breast     Breast cancer Mother     Cancer Father         melanoma    Melanoma Father     Cancer Paternal Grandmother     Breast cancer Paternal Grandmother     Cancer Paternal Grandfather     Cancer Sister         breast    Breast cancer Sister     Colon cancer Maternal Grandmother     Ovarian cancer Neg Hx      Social History     Tobacco Use    Smoking status: Current Every Day Smoker     Packs/day: 0.40     Types: Cigarettes    Smokeless tobacco: Never Used    Tobacco comment: pt on chantix   Substance Use Topics  "   Alcohol use: Yes    Drug use: No        Review of Systems:  Review of Systems   Gastrointestinal: Negative for abdominal pain, blood in stool, constipation and diarrhea.        Anal irritation       OBJECTIVE:     Vital Signs (Most Recent)  Blood Pressure 106/66 (BP Location: Left arm, Patient Position: Sitting, BP Method: Large (Automatic))   Pulse 108   Height 5' 1" (1.549 m)   Weight 56.9 kg (125 lb 7.1 oz)   Body Mass Index 23.70 kg/m²     Physical Exam:  General: White female in no distress   Neuro: Alert and oriented to person, place, and time.  Moves all extremities.     HEENT: No icterus.  Trachea midline  Respiratory: Respirations are even and unlabored, no cough or audible wheezing  Skin: Warm dry and intact, No visible rashes, no jaundice    Labs reviewed today:  Lab Results   Component Value Date    WBC 9.6 07/01/2021    HGB 13.3 07/01/2021    HCT 39.7 07/01/2021     07/01/2021    CHOL 112 07/01/2021    TRIG 40 07/01/2021    HDL 38 (L) 07/01/2021    ALT 7 07/01/2021    AST 13 07/01/2021     07/01/2021    K 4.3 07/01/2021     07/01/2021    CREATININE 0.72 07/01/2021    BUN 9 07/01/2021    CO2 24 07/01/2021    TSH 1.00 07/01/2021    INR 0.9 09/19/2007    HGBA1C 5.1 07/01/2021       Imaging reviewed today:  none    Endoscopy reviewed today:  none    Anorectal Exam:    Anal Skin: midline anterior and posterior tags      ASSESSMENT/PLAN:     Diagnoses and all orders for this visit:    Anal irritation  -     Ambulatory referral/consult to Colorectal Surgery        The patient was instructed to:  anusol cream 2x/day for 2 weeks  Discontinue wet-wipes use  Increase water intake to at least 8-10 glasses of water per day.  Take a daily fiber supplement (Konsyl, Benefiber, Metamucil) and increase dietary intake to 20-30 grams/day.  Avoid straining or spending >5min/event with bowel movements.  Follow-up in clinic prn with MD if would like removed.      LAURA Owens  Colon and " Rectal Surgery

## 2022-07-25 LAB
CLINICAL INFO: NORMAL
CYTO CVX: NORMAL
CYTOLOGIST CVX/VAG CYTO: NORMAL
CYTOLOGIST CVX/VAG CYTO: NORMAL
CYTOLOGY CMNT CVX/VAG CYTO-IMP: NORMAL
CYTOLOGY PAP THIN PREP EXPLANATION: NORMAL
DATE OF PREVIOUS PAP: YES
DATE PREVIOUS BX: YES
GEN CATEG CVX/VAG CYTO-IMP: NORMAL
HPV I/H RISK 4 DNA CVX QL NAA+PROBE: NOT DETECTED
LMP START DATE: NORMAL
MICROORGANISM CVX/VAG CYTO: NORMAL
PATHOLOGIST CVX/VAG CYTO: NORMAL
SERVICE CMNT-IMP: NORMAL
SPECIMEN SOURCE CVX/VAG CYTO: NORMAL
STAT OF ADQ CVX/VAG CYTO-IMP: NORMAL

## 2022-09-06 NOTE — PROGRESS NOTES
"Subjective:      Tita Campos is a 42 y.o. female who presents for evaluation of right ureter stone.      Urolithiasis  Patient complains of right flank pain with and without radiation to the groin. Onset of symptoms was abrupt starting 2 days ago with stable course since that time. Patient describes the pain as colicky, cramping, and shooting, and rated as moderate and off and on. The patient has had no nausea/no vomiting and no diaphoresis. There has been no fever or chills. The patient is not complaining of dysuria or frequency. Risk factors for urolithiasis: family history of stone disease, history of stone disease, and poor fluid intake.  She presented to ED on 9/6 with theses complaints. CT RSS revealed 4 mm right ureter stone with hydronephrosis. UA showed 20 RBCs/HPF. No UC obtained. Blood cultures pending. CR normal. She was discharged with MET. She has not passed stone.     The following portions of the patient's history were reviewed and updated as appropriate: allergies, current medications, past family history, past medical history, past social history, past surgical history and problem list.    Review of Systems  Constitutional: no fever or chills  ENT: no nasal congestion or sore throat  Respiratory: no cough or shortness of breath  Cardiovascular: no chest pain or palpitations  Gastrointestinal: no nausea or vomiting, tolerating diet  Genitourinary: as per HPI  Hematologic/Lymphatic: no easy bruising or lymphadenopathy  Musculoskeletal: no arthralgias or myalgias  Neurological: no seizures or tremors  Behavioral/Psych: no auditory or visual hallucinations     Objective:   Vital Signs:/63 (BP Location: Left arm, Patient Position: Sitting, BP Method: Small (Automatic))   Pulse 71   Resp 18   Ht 5' 1" (1.549 m)   Wt 53.5 kg (118 lb 0.9 oz)   LMP 06/01/2022 (Approximate)   BMI 22.31 kg/m²     Physical Exam   General: alert and oriented, no acute distress  Head: normocephalic, " atraumatic  Neck: supple, no lymphadenopathy, normal ROM, no masses  Respiratory: Symmetric expansion, non-labored breathing  Cardiovascular: regular rate and rhythm, nomal pulses, no peripheral edema  Abdomen: soft, non tender, non distended, no palpable masses, no hernias, no hepatomegaly or splenomegaly  Skin: normal coloration and turgor, no rashes, no suspicious skin lesions noted  Neuro: alert and oriented x3, no gross deficits  Psych: normal judgment and insight, normal mood/affect, and non-anxious    Lab Review   Urinalysis demonstrates no specimen   Lab Results   Component Value Date    WBC 11.19 09/06/2022    HGB 14.8 09/06/2022    HCT 44.8 09/06/2022    MCV 95 09/06/2022     09/06/2022     Lab Results   Component Value Date    CREATININE 0.9 09/06/2022    BUN 12 09/06/2022       Imaging  Results for orders placed during the hospital encounter of 09/06/22    CT Renal Stone Study ABD Pelvis WO    Narrative  EXAMINATION:  CT RENAL STONE STUDY ABD PELVIS WO    CLINICAL HISTORY:  Flank pain, kidney stone suspected;    TECHNIQUE:  Low dose axial images, sagittal and coronal reformations were obtained from the lung bases to the pubic symphysis.  Contrast was not administered.    COMPARISON:  08/30/2022.    FINDINGS:  The lung bases are clear.  The bones are intact.  There is no evidence for acute fracture or bone destruction.  The liver is normal in size and is homogeneous in density with no focal liver lesions identified.  The gallbladder is present and appears grossly unremarkable.  No biliary dilatation is appreciated on this noncontrast examination.  The spleen, stomach, and pancreas all appear grossly unremarkable.  The adrenal glands are not enlarged.  The kidneys are normal in size, position, and contour.  There is no evidence for abnormal renal masses or hydronephrosis.  There is a punctate nonobstructing calculus within the mid left kidney measuring proximally 2 mm.  There is a 4 mm calculus  within the proximal right ureter.  There is a small amount of atherosclerotic calcification present within the abdominal aorta which tapers normally without aneurysmal dilatation.  No para-aortic lymphadenopathy is identified.  The appendix is present and appears grossly unremarkable.  No dilated loops of bowel are evident.  The uterus is present.  No abnormal adnexal masses are identified.  The urinary bladder appears grossly unremarkable.  There is no evidence for pelvic or inguinal lymphadenopathy.  No ascites is identified.  There is a tiny fat containing umbilical hernia.    Impression  4 mm proximal right ureteral calculus without evidence for hydronephrosis.    2 mm punctate nonobstructing calculus within the mid left kidney.    Tiny fat containing umbilical hernia.      Electronically signed by: Abdoulaye Sepulveda MD  Date:    09/06/2022  Time:    09:16      Assessment and Plan:   1. Right ureteral stone  2. Hydronephrosis of right kidney  Recommend general preventive measures, to include increased hydration, low Na diet, and increased citrus intake  Discussed indications to present to ED, including fever, intractable pain, and intractable nausea/vomitting    --Drink 2-3 liters of water daily  --Continue flomax  --Continue Toradol for pain   --Continue narcotics as needed for breakthrough pain  --Strain every urine.  Strainer provided.    --Patient instructed to bring in stone for stone analysis.  Sterile cup provided.       - Discussed red flags to go to ER: fever, inability to tolerate PO, pain not controlled by medications.     3. History of kidney stones  --We discussed how to prevent future stones:   --Eat fewer high-oxalate foods (spinach, bran flakes, rhubarb, beets, potato chips, french fries, nuts and nut butters)   --Eat calcium rich foods   --Limit the vitamin C content of your diet. Do not take more than 500 mg of Vitamin C daily.   --It is very important to drink plenty of liquids. Your goal should  be 10-12 glasses a day. At least 5-6 glasses should be water.    --Reduce sodium intake: 2-3 grams per day. Limit eating processed foods such as hot dogs, deli meats, sausage, canned products, dry soup mixes, sauerkraut, pickles, and various convenience mixes.   --Add citrus to diet i.e. lemonade, limes, orange juice.     --RTC in 1 month with US     This note is dictated on M*Modal word recognition program.  There are word recognition mistakes that are occasionally missed on review.

## 2022-09-07 ENCOUNTER — OFFICE VISIT (OUTPATIENT)
Dept: UROLOGY | Facility: CLINIC | Age: 42
End: 2022-09-07
Payer: COMMERCIAL

## 2022-09-07 VITALS
WEIGHT: 118.06 LBS | HEIGHT: 61 IN | RESPIRATION RATE: 18 BRPM | BODY MASS INDEX: 22.29 KG/M2 | SYSTOLIC BLOOD PRESSURE: 112 MMHG | DIASTOLIC BLOOD PRESSURE: 63 MMHG | HEART RATE: 71 BPM

## 2022-09-07 DIAGNOSIS — Z87.442 HISTORY OF KIDNEY STONES: ICD-10-CM

## 2022-09-07 DIAGNOSIS — N20.1 RIGHT URETERAL STONE: Primary | ICD-10-CM

## 2022-09-07 DIAGNOSIS — N13.30 HYDRONEPHROSIS OF RIGHT KIDNEY: ICD-10-CM

## 2022-09-07 DIAGNOSIS — R10.9 RIGHT FLANK PAIN: ICD-10-CM

## 2022-09-07 PROCEDURE — 99203 PR OFFICE/OUTPT VISIT, NEW, LEVL III, 30-44 MIN: ICD-10-PCS | Mod: S$GLB,,, | Performed by: NURSE PRACTITIONER

## 2022-09-07 PROCEDURE — 99203 OFFICE O/P NEW LOW 30 MIN: CPT | Mod: S$GLB,,, | Performed by: NURSE PRACTITIONER

## 2022-09-07 PROCEDURE — 99999 PR PBB SHADOW E&M-EST. PATIENT-LVL V: CPT | Mod: PBBFAC,,, | Performed by: NURSE PRACTITIONER

## 2022-09-07 PROCEDURE — 1160F RVW MEDS BY RX/DR IN RCRD: CPT | Mod: CPTII,S$GLB,, | Performed by: NURSE PRACTITIONER

## 2022-09-07 PROCEDURE — 87086 URINE CULTURE/COLONY COUNT: CPT | Performed by: NURSE PRACTITIONER

## 2022-09-07 PROCEDURE — 1159F PR MEDICATION LIST DOCUMENTED IN MEDICAL RECORD: ICD-10-PCS | Mod: CPTII,S$GLB,, | Performed by: NURSE PRACTITIONER

## 2022-09-07 PROCEDURE — 3074F SYST BP LT 130 MM HG: CPT | Mod: CPTII,S$GLB,, | Performed by: NURSE PRACTITIONER

## 2022-09-07 PROCEDURE — 1159F MED LIST DOCD IN RCRD: CPT | Mod: CPTII,S$GLB,, | Performed by: NURSE PRACTITIONER

## 2022-09-07 PROCEDURE — 1160F PR REVIEW ALL MEDS BY PRESCRIBER/CLIN PHARMACIST DOCUMENTED: ICD-10-PCS | Mod: CPTII,S$GLB,, | Performed by: NURSE PRACTITIONER

## 2022-09-07 PROCEDURE — 3008F PR BODY MASS INDEX (BMI) DOCUMENTED: ICD-10-PCS | Mod: CPTII,S$GLB,, | Performed by: NURSE PRACTITIONER

## 2022-09-07 PROCEDURE — 3078F PR MOST RECENT DIASTOLIC BLOOD PRESSURE < 80 MM HG: ICD-10-PCS | Mod: CPTII,S$GLB,, | Performed by: NURSE PRACTITIONER

## 2022-09-07 PROCEDURE — 3008F BODY MASS INDEX DOCD: CPT | Mod: CPTII,S$GLB,, | Performed by: NURSE PRACTITIONER

## 2022-09-07 PROCEDURE — 3078F DIAST BP <80 MM HG: CPT | Mod: CPTII,S$GLB,, | Performed by: NURSE PRACTITIONER

## 2022-09-07 PROCEDURE — 99999 PR PBB SHADOW E&M-EST. PATIENT-LVL V: ICD-10-PCS | Mod: PBBFAC,,, | Performed by: NURSE PRACTITIONER

## 2022-09-07 PROCEDURE — 3074F PR MOST RECENT SYSTOLIC BLOOD PRESSURE < 130 MM HG: ICD-10-PCS | Mod: CPTII,S$GLB,, | Performed by: NURSE PRACTITIONER

## 2022-09-07 RX ORDER — KETOROLAC TROMETHAMINE 10 MG/1
10 TABLET, FILM COATED ORAL EVERY 6 HOURS PRN
Qty: 30 TABLET | Refills: 0 | Status: SHIPPED | OUTPATIENT
Start: 2022-09-07 | End: 2024-01-25

## 2022-09-07 RX ORDER — TAMSULOSIN HYDROCHLORIDE 0.4 MG/1
0.4 CAPSULE ORAL DAILY
Qty: 30 CAPSULE | Refills: 0 | Status: SHIPPED | OUTPATIENT
Start: 2022-09-07 | End: 2022-10-07

## 2022-09-09 LAB — BACTERIA UR CULT: NORMAL

## 2022-09-14 ENCOUNTER — CLINICAL SUPPORT (OUTPATIENT)
Dept: SMOKING CESSATION | Facility: CLINIC | Age: 42
End: 2022-09-14
Payer: COMMERCIAL

## 2022-09-14 DIAGNOSIS — F17.200 NICOTINE DEPENDENCE: Primary | ICD-10-CM

## 2022-09-14 PROCEDURE — 99999 PR PBB SHADOW E&M-EST. PATIENT-LVL I: CPT | Mod: PBBFAC,,,

## 2022-09-14 PROCEDURE — 99407 BEHAV CHNG SMOKING > 10 MIN: CPT | Mod: S$GLB,,,

## 2022-09-14 PROCEDURE — 99407 PR TOBACCO USE CESSATION INTENSIVE >10 MINUTES: ICD-10-PCS | Mod: S$GLB,,,

## 2022-09-14 PROCEDURE — 99999 PR PBB SHADOW E&M-EST. PATIENT-LVL I: ICD-10-PCS | Mod: PBBFAC,,,

## 2022-09-14 NOTE — PROGRESS NOTES
Called pt to f/u on her 3/6 month smoking cessation quit status. Pt stated she is still smoking. Informed her she has benefits available and is able to rejoin. Pt not ready to make appointment. She will call back when ready. Stated she is going through treatments at this time. Informed her of benefit period, phone follow ups, and contact information. Will complete smart form and will continue to follow up on quit #5 episode.

## 2022-09-22 DIAGNOSIS — Z12.31 ENCOUNTER FOR SCREENING MAMMOGRAM FOR MALIGNANT NEOPLASM OF BREAST: Primary | ICD-10-CM

## 2022-09-22 DIAGNOSIS — Z98.890 HISTORY OF BREAST BIOPSY: ICD-10-CM

## 2022-09-23 ENCOUNTER — PATIENT MESSAGE (OUTPATIENT)
Dept: OBSTETRICS AND GYNECOLOGY | Facility: CLINIC | Age: 42
End: 2022-09-23
Payer: COMMERCIAL

## 2022-10-11 ENCOUNTER — OFFICE VISIT (OUTPATIENT)
Dept: OBSTETRICS AND GYNECOLOGY | Facility: CLINIC | Age: 42
End: 2022-10-11
Payer: COMMERCIAL

## 2022-10-11 VITALS
WEIGHT: 122.38 LBS | HEIGHT: 61 IN | SYSTOLIC BLOOD PRESSURE: 104 MMHG | BODY MASS INDEX: 23.11 KG/M2 | DIASTOLIC BLOOD PRESSURE: 68 MMHG | HEART RATE: 111 BPM

## 2022-10-11 DIAGNOSIS — Z80.3 FAMILY HISTORY OF BREAST CANCER: ICD-10-CM

## 2022-10-11 DIAGNOSIS — N64.4 BREAST PAIN, LEFT: Primary | ICD-10-CM

## 2022-10-11 DIAGNOSIS — Z91.89 INCREASED RISK OF BREAST CANCER: ICD-10-CM

## 2022-10-11 DIAGNOSIS — Z12.39 BREAST CANCER SCREENING, HIGH RISK PATIENT: ICD-10-CM

## 2022-10-11 PROCEDURE — 3008F PR BODY MASS INDEX (BMI) DOCUMENTED: ICD-10-PCS | Mod: CPTII,S$GLB,, | Performed by: PHYSICIAN ASSISTANT

## 2022-10-11 PROCEDURE — 3008F BODY MASS INDEX DOCD: CPT | Mod: CPTII,S$GLB,, | Performed by: PHYSICIAN ASSISTANT

## 2022-10-11 PROCEDURE — 1160F RVW MEDS BY RX/DR IN RCRD: CPT | Mod: CPTII,S$GLB,, | Performed by: PHYSICIAN ASSISTANT

## 2022-10-11 PROCEDURE — 3078F DIAST BP <80 MM HG: CPT | Mod: CPTII,S$GLB,, | Performed by: PHYSICIAN ASSISTANT

## 2022-10-11 PROCEDURE — 99999 PR PBB SHADOW E&M-EST. PATIENT-LVL IV: CPT | Mod: PBBFAC,,, | Performed by: PHYSICIAN ASSISTANT

## 2022-10-11 PROCEDURE — 1160F PR REVIEW ALL MEDS BY PRESCRIBER/CLIN PHARMACIST DOCUMENTED: ICD-10-PCS | Mod: CPTII,S$GLB,, | Performed by: PHYSICIAN ASSISTANT

## 2022-10-11 PROCEDURE — 3074F PR MOST RECENT SYSTOLIC BLOOD PRESSURE < 130 MM HG: ICD-10-PCS | Mod: CPTII,S$GLB,, | Performed by: PHYSICIAN ASSISTANT

## 2022-10-11 PROCEDURE — 99999 PR PBB SHADOW E&M-EST. PATIENT-LVL IV: ICD-10-PCS | Mod: PBBFAC,,, | Performed by: PHYSICIAN ASSISTANT

## 2022-10-11 PROCEDURE — 99214 OFFICE O/P EST MOD 30 MIN: CPT | Mod: S$GLB,,, | Performed by: PHYSICIAN ASSISTANT

## 2022-10-11 PROCEDURE — 3078F PR MOST RECENT DIASTOLIC BLOOD PRESSURE < 80 MM HG: ICD-10-PCS | Mod: CPTII,S$GLB,, | Performed by: PHYSICIAN ASSISTANT

## 2022-10-11 PROCEDURE — 99214 PR OFFICE/OUTPT VISIT, EST, LEVL IV, 30-39 MIN: ICD-10-PCS | Mod: S$GLB,,, | Performed by: PHYSICIAN ASSISTANT

## 2022-10-11 PROCEDURE — 1159F MED LIST DOCD IN RCRD: CPT | Mod: CPTII,S$GLB,, | Performed by: PHYSICIAN ASSISTANT

## 2022-10-11 PROCEDURE — 1159F PR MEDICATION LIST DOCUMENTED IN MEDICAL RECORD: ICD-10-PCS | Mod: CPTII,S$GLB,, | Performed by: PHYSICIAN ASSISTANT

## 2022-10-11 PROCEDURE — 3074F SYST BP LT 130 MM HG: CPT | Mod: CPTII,S$GLB,, | Performed by: PHYSICIAN ASSISTANT

## 2022-10-11 NOTE — PROGRESS NOTES
History:       HPI:   Tita Campos presents for follow up of increased risk of breast cancer. Previously calculated her Tyrer-Cuzick score to be  29.7%. Abnormal breast MRI last year. Left breast biopsy on 12/20/21 sclerosing intraductal papilloma. See below path.  Saw Breast Surgery, Dr. Thomas, who discussed excision vs surveillance. Opted for surveillance. However, continues to have pain in the left breast at site of biopsy that seems to be getting worse. No changes in skin or mass. Scheduled for screening mammogram that kept being rescheduled since needed diagnostic. Has tried to scheduled follow up with Dr. Thomas about breast pain but was told needed mammogram first.    BREAST IMAGING  Bilateral Screening Mammogram: 6/8/2021- negative  Breast MRI:11/22/21- Non-mass enhancement in the left breast at 6:00  Left Breast Biopsy: 12/20/21- Benign breast tissue with sclerosing intraductal papilloma with apocrine metaplasia and usual duct hyperplasia (UDH) and associated duct ectasia. Negative for atypia or malignancy     Past Medical   Past Medical History:   Diagnosis Date    Basal cell carcinoma     Cancer     skin cancer , basal cell CA     Cancer     cervical cancer    Fibrocystic breast     Melanoma 1990's     Patient Active Problem List   Diagnosis    History of cervical cancer    Skin cancer    BMI 28.0-28.9,adult    Smoking addiction    Abnormal uterine bleeding    Pain of right lower extremity    Mixed hyperlipidemia    Current mild episode of major depressive disorder without prior episode       Social History   Social History     Tobacco Use    Smoking status: Every Day     Packs/day: 0.40     Types: Cigarettes    Smokeless tobacco: Never    Tobacco comments:     pt on chantix   Substance Use Topics    Alcohol use: Yes    Drug use: No       Family History  Family History   Problem Relation Age of Onset    Cancer Mother         breast     Breast cancer Mother     Cancer Father         melanoma    Melanoma  Father     Cancer Paternal Grandmother     Breast cancer Paternal Grandmother     Cancer Paternal Grandfather     Cancer Sister         breast    Breast cancer Sister     Colon cancer Maternal Grandmother     Ovarian cancer Neg Hx        Medications    Current Outpatient Medications:     clobetasoL (TEMOVATE) 0.05 % cream, Apply topically 2 (two) times daily. Prn itch.  Stop using steroid topical when skin is smooth and non itchy.  Do not treat dark or red coloring. (Patient not taking: Reported on 9/7/2022), Disp: 45 g, Rfl: 0    ergocalciferol (ERGOCALCIFEROL) 50,000 unit Cap, Take 50,000 Units by mouth every 7 days., Disp: , Rfl:     hydrocortisone (ANUSOL-HC) 2.5 % rectal cream, Place rectally 2 (two) times daily. (Patient not taking: Reported on 9/7/2022), Disp: 28 g, Rfl: 2    ketorolac (TORADOL) 10 mg tablet, Take 1 tablet (10 mg total) by mouth every 6 (six) hours as needed for Pain., Disp: 30 tablet, Rfl: 0    medroxyPROGESTERone (DEPO-PROVERA) 150 mg/mL Syrg, Inject 1 mL (150 mg total) into the muscle every 3 (three) months. (Patient not taking: Reported on 9/7/2022), Disp: 1 Syringe, Rfl: 3    nicotine (NICODERM CQ) 21 mg/24 hr, Place 1 patch onto the skin once daily. (Patient not taking: Reported on 9/7/2022), Disp: 28 patch, Rfl: 0    ondansetron (ZOFRAN-ODT) 4 MG TbDL, Take 1 tablet (4 mg total) by mouth 3 (three) times daily. (Patient not taking: Reported on 9/7/2022), Disp: 10 tablet, Rfl: 0    simvastatin (ZOCOR) 10 MG tablet, Take 1 tablet (10 mg total) by mouth every evening. (Patient not taking: Reported on 9/7/2022), Disp: 90 tablet, Rfl: 0    tamsulosin (FLOMAX) 0.4 mg Cap, Take 1 capsule (0.4 mg total) by mouth once daily. until the kidney stone is passed, or complete resolution of pain, Disp: 7 capsule, Rfl: 0    traZODone (DESYREL) 50 MG tablet, Take 2 tablets (100 mg total) by mouth every evening., Disp: 180 tablet, Rfl: 2    Current Facility-Administered Medications:      "medroxyPROGESTERone (DEPO-PROVERA) injection 150 mg, 150 mg, Intramuscular, Q90 Days, Elio Iglesias MD, 150 mg at 11/18/21 1413    Allergies  Review of patient's allergies indicates:   Allergen Reactions    Adhesive        Review of Systems  General: No fever, chills, or weight loss.  Chest: No chest pain, shortness of breath, or palpitations.  Breast: No masses, or nipple discharge. +left breast pain  Vulva: No pain, lesions, or itching.  Vagina: No relaxation, itching, discharge, or lesions.  Abdomen: No pain, nausea, vomiting, diarrhea, or constipation.  Urinary: No incontinence, nocturia, frequency, or dysuria.  Extremities:  No leg cramps, edema, or calf pain.  Neurologic: No headaches, dizziness, or visual changes.    Objective:     Vitals:    10/11/22 1308   BP: 104/68   Pulse: (!) 111   Weight: 55.5 kg (122 lb 6.4 oz)   Height: 5' 1" (1.549 m)     Body mass index is 23.13 kg/m².    GENERAL:  Well-developed, well-nourished female in no acute distress. Vital signs reviewed.   SKIN:  Warm, dry without rashes, purpura or petechiae.  EYES:  EOMs intact.  Conjunctivae normal.  HEAD:  Normocephalic.  EARS/NOSE/MOUTH/THROAT:  Hearing intact.   NECK:  Supple with good range of motion; no masses  PSYCHIATRIC:  Normal affect and mood.  Alert and Oriented x 3.   BREASTS:Symmetrical, no skin changes or visible lesions.  No palpable masses, nipple discharge bilaterally.      Assessment:     Increased risk of breast cancer: This is a 42 y.o. female who presents for follow up of increased risk of breast cancer based on an elevated Tyrer Cuzick score. Will obtain bilateral diagnostic mammogram since continues to have left breast pain and due for screening. Needs follow up with Breast Surgery due to breast pain.     Plan:   Continue annual screening mammograms and breast MRI, alternating imaging every 6 months until age 73.    Bilateral diagnostic mammogram for left breast pain.  Breast MRI in 4/2023.  Two physical exams " per year, one can be with her OB/GYN.    Message sent to Breast Surgery about scheduling follow up for breast pain.    Follow up in 1 year for CBE.     I spent a total of 30 minutes on the day of the visit.This includes face to face time and non-face to face time preparing to see the patient (eg, review of tests), obtaining and/or reviewing separately obtained history, documenting clinical information in the electronic or other health record, independently interpreting results and communicating results to the patient/family/caregiver, or care coordinator.

## 2022-10-12 ENCOUNTER — TELEPHONE (OUTPATIENT)
Dept: SURGERY | Facility: CLINIC | Age: 42
End: 2022-10-12
Payer: COMMERCIAL

## 2022-10-12 ENCOUNTER — CLINICAL SUPPORT (OUTPATIENT)
Dept: UROLOGY | Facility: CLINIC | Age: 42
End: 2022-10-12
Payer: COMMERCIAL

## 2022-10-12 DIAGNOSIS — N20.1 RIGHT URETERAL STONE: ICD-10-CM

## 2022-10-12 DIAGNOSIS — N13.30 HYDRONEPHROSIS OF RIGHT KIDNEY: Primary | ICD-10-CM

## 2022-10-12 PROCEDURE — 82365 CALCULUS SPECTROSCOPY: CPT | Performed by: NURSE PRACTITIONER

## 2022-10-12 NOTE — TELEPHONE ENCOUNTER
Attempted to contact patient regarding message below. Patient did not answer, message left with my name and direct number for patient to contact back.      ----- Message from Lizbeth Ybarra PA-C sent at 10/11/2022  1:28 PM CDT -----  David Suarez,  This patient had a breast biopsy that showed sclerosing intraductal papilloma in the left breast last year. Met with Dr. Thomas in 12/2021 and decided to not have excised. She continues to have left breast pain in the area that is getting worse. I ordered a diagnostic mammo but can she get scheduled with breast surgery for follow up please? Thank you!  Lizbeth

## 2022-10-13 ENCOUNTER — TELEPHONE (OUTPATIENT)
Dept: SURGERY | Facility: CLINIC | Age: 42
End: 2022-10-13
Payer: COMMERCIAL

## 2022-10-13 NOTE — TELEPHONE ENCOUNTER
2nd attempt to contact patient regarding message below. The patient did not answer, message left with my name and direct number for patient to contact back.      ----- Message from Lizbeth Ybarra PA-C sent at 10/11/2022  1:28 PM CDT -----  David Suarez,  This patient had a breast biopsy that showed sclerosing intraductal papilloma in the left breast last year. Met with Dr. Thomas in 12/2021 and decided to not have excised. She continues to have left breast pain in the area that is getting worse. I ordered a diagnostic mammo but can she get scheduled with breast surgery for follow up please? Thank you!  Lizbeth

## 2022-10-17 ENCOUNTER — PATIENT MESSAGE (OUTPATIENT)
Dept: SURGERY | Facility: CLINIC | Age: 42
End: 2022-10-17
Payer: COMMERCIAL

## 2022-10-17 ENCOUNTER — TELEPHONE (OUTPATIENT)
Dept: SURGERY | Facility: CLINIC | Age: 42
End: 2022-10-17
Payer: COMMERCIAL

## 2022-10-17 NOTE — TELEPHONE ENCOUNTER
3rd attempt to contact patient regarding message below. Patient did not answer, message left with my name and direct number for patient to contact back. Will send patient portal message as well.    ----- Message from Lizbeth Ybarra PA-C sent at 10/11/2022  1:28 PM CDT -----  David Suarez,  This patient had a breast biopsy that showed sclerosing intraductal papilloma in the left breast last year. Met with Dr. Thomas in 12/2021 and decided to not have excised. She continues to have left breast pain in the area that is getting worse. I ordered a diagnostic mammo but can she get scheduled with breast surgery for follow up please? Thank you!  Lizbeth

## 2022-10-20 LAB
COMPN STONE: NORMAL
SPECIMEN SOURCE: NORMAL
STONE ANALYSIS IR-IMP: NORMAL

## 2022-12-08 ENCOUNTER — PATIENT MESSAGE (OUTPATIENT)
Dept: OBSTETRICS AND GYNECOLOGY | Facility: CLINIC | Age: 42
End: 2022-12-08
Payer: COMMERCIAL

## 2022-12-08 ENCOUNTER — HOSPITAL ENCOUNTER (OUTPATIENT)
Dept: RADIOLOGY | Facility: OTHER | Age: 42
Discharge: HOME | End: 2022-12-08
Attending: PHYSICIAN ASSISTANT
Payer: COMMERCIAL

## 2022-12-08 DIAGNOSIS — N64.4 BREAST PAIN, LEFT: ICD-10-CM

## 2022-12-08 PROCEDURE — 77066 MAMMO DIGITAL DIAGNOSTIC BILAT WITH TOMO: ICD-10-PCS | Mod: 26,,, | Performed by: RADIOLOGY

## 2022-12-08 PROCEDURE — 77062 BREAST TOMOSYNTHESIS BI: CPT | Mod: 26,,, | Performed by: RADIOLOGY

## 2022-12-08 PROCEDURE — 77066 DX MAMMO INCL CAD BI: CPT | Mod: TC

## 2022-12-08 PROCEDURE — 77066 DX MAMMO INCL CAD BI: CPT | Mod: 26,,, | Performed by: RADIOLOGY

## 2022-12-08 PROCEDURE — 77062 MAMMO DIGITAL DIAGNOSTIC BILAT WITH TOMO: ICD-10-PCS | Mod: 26,,, | Performed by: RADIOLOGY

## 2023-05-25 ENCOUNTER — PATIENT MESSAGE (OUTPATIENT)
Dept: SMOKING CESSATION | Facility: CLINIC | Age: 43
End: 2023-05-25
Payer: COMMERCIAL

## 2023-06-09 ENCOUNTER — PATIENT MESSAGE (OUTPATIENT)
Dept: OBSTETRICS AND GYNECOLOGY | Facility: CLINIC | Age: 43
End: 2023-06-09
Payer: COMMERCIAL

## 2023-06-09 ENCOUNTER — TELEPHONE (OUTPATIENT)
Dept: OBSTETRICS AND GYNECOLOGY | Facility: CLINIC | Age: 43
End: 2023-06-09
Payer: COMMERCIAL

## 2023-06-12 ENCOUNTER — PATIENT MESSAGE (OUTPATIENT)
Dept: HEPATOLOGY | Facility: CLINIC | Age: 43
End: 2023-06-12
Payer: COMMERCIAL

## 2023-06-13 ENCOUNTER — TELEPHONE (OUTPATIENT)
Dept: OBSTETRICS AND GYNECOLOGY | Facility: CLINIC | Age: 43
End: 2023-06-13
Payer: COMMERCIAL

## 2023-06-13 NOTE — TELEPHONE ENCOUNTER
Called pt to RS appt , no answer, left VM to confirm the 8/24 appt @ 9:40 am with David that she requested.

## 2023-06-15 ENCOUNTER — PATIENT MESSAGE (OUTPATIENT)
Dept: OBSTETRICS AND GYNECOLOGY | Facility: CLINIC | Age: 43
End: 2023-06-15
Payer: COMMERCIAL

## 2023-06-28 ENCOUNTER — PATIENT MESSAGE (OUTPATIENT)
Dept: OBSTETRICS AND GYNECOLOGY | Facility: CLINIC | Age: 43
End: 2023-06-28
Payer: COMMERCIAL

## 2023-10-09 ENCOUNTER — PATIENT MESSAGE (OUTPATIENT)
Dept: OBSTETRICS AND GYNECOLOGY | Facility: CLINIC | Age: 43
End: 2023-10-09

## 2023-10-09 ENCOUNTER — OFFICE VISIT (OUTPATIENT)
Dept: OBSTETRICS AND GYNECOLOGY | Facility: CLINIC | Age: 43
End: 2023-10-09
Payer: COMMERCIAL

## 2023-10-09 VITALS
DIASTOLIC BLOOD PRESSURE: 58 MMHG | HEART RATE: 80 BPM | BODY MASS INDEX: 22.28 KG/M2 | SYSTOLIC BLOOD PRESSURE: 91 MMHG | WEIGHT: 118 LBS | HEIGHT: 61 IN

## 2023-10-09 DIAGNOSIS — Z80.3 FAMILY HISTORY OF BREAST CANCER: ICD-10-CM

## 2023-10-09 DIAGNOSIS — Z12.31 SCREENING MAMMOGRAM, ENCOUNTER FOR: ICD-10-CM

## 2023-10-09 DIAGNOSIS — Z12.39 BREAST CANCER SCREENING, HIGH RISK PATIENT: ICD-10-CM

## 2023-10-09 DIAGNOSIS — Z91.89 INCREASED RISK OF BREAST CANCER: Primary | ICD-10-CM

## 2023-10-09 PROCEDURE — 3074F SYST BP LT 130 MM HG: CPT | Mod: CPTII,S$GLB,, | Performed by: PHYSICIAN ASSISTANT

## 2023-10-09 PROCEDURE — 99213 PR OFFICE/OUTPT VISIT, EST, LEVL III, 20-29 MIN: ICD-10-PCS | Mod: S$GLB,,, | Performed by: PHYSICIAN ASSISTANT

## 2023-10-09 PROCEDURE — 1160F RVW MEDS BY RX/DR IN RCRD: CPT | Mod: CPTII,S$GLB,, | Performed by: PHYSICIAN ASSISTANT

## 2023-10-09 PROCEDURE — 3008F BODY MASS INDEX DOCD: CPT | Mod: CPTII,S$GLB,, | Performed by: PHYSICIAN ASSISTANT

## 2023-10-09 PROCEDURE — 99999 PR PBB SHADOW E&M-EST. PATIENT-LVL IV: CPT | Mod: PBBFAC,,, | Performed by: PHYSICIAN ASSISTANT

## 2023-10-09 PROCEDURE — 1159F PR MEDICATION LIST DOCUMENTED IN MEDICAL RECORD: ICD-10-PCS | Mod: CPTII,S$GLB,, | Performed by: PHYSICIAN ASSISTANT

## 2023-10-09 PROCEDURE — 3074F PR MOST RECENT SYSTOLIC BLOOD PRESSURE < 130 MM HG: ICD-10-PCS | Mod: CPTII,S$GLB,, | Performed by: PHYSICIAN ASSISTANT

## 2023-10-09 PROCEDURE — 3078F DIAST BP <80 MM HG: CPT | Mod: CPTII,S$GLB,, | Performed by: PHYSICIAN ASSISTANT

## 2023-10-09 PROCEDURE — 3078F PR MOST RECENT DIASTOLIC BLOOD PRESSURE < 80 MM HG: ICD-10-PCS | Mod: CPTII,S$GLB,, | Performed by: PHYSICIAN ASSISTANT

## 2023-10-09 PROCEDURE — 3008F PR BODY MASS INDEX (BMI) DOCUMENTED: ICD-10-PCS | Mod: CPTII,S$GLB,, | Performed by: PHYSICIAN ASSISTANT

## 2023-10-09 PROCEDURE — 99999 PR PBB SHADOW E&M-EST. PATIENT-LVL IV: ICD-10-PCS | Mod: PBBFAC,,, | Performed by: PHYSICIAN ASSISTANT

## 2023-10-09 PROCEDURE — 1159F MED LIST DOCD IN RCRD: CPT | Mod: CPTII,S$GLB,, | Performed by: PHYSICIAN ASSISTANT

## 2023-10-09 PROCEDURE — 99213 OFFICE O/P EST LOW 20 MIN: CPT | Mod: S$GLB,,, | Performed by: PHYSICIAN ASSISTANT

## 2023-10-09 PROCEDURE — 1160F PR REVIEW ALL MEDS BY PRESCRIBER/CLIN PHARMACIST DOCUMENTED: ICD-10-PCS | Mod: CPTII,S$GLB,, | Performed by: PHYSICIAN ASSISTANT

## 2023-10-09 NOTE — PROGRESS NOTES
History:       HPI:   Tita Campos presents today with her daughter for follow up of increased risk of breast cancer. Previously calculated her Tyrer-Cuzick score to be 29.7%.  No significant changes in her personal or family medical history since last seen.  Denies breast mass, changes in skin or nipple, or drainage. Pain in the left breast after the biopsy on 2021 has improved. Opted for surveillance of intraductal papilloma vs excision. However, she missed breast MRI last year.    BREAST IMAGING  Bilateral Screening Mammogram: 2021- negative  Breast MRI:21- Non-mass enhancement in the left breast at 6:00  Left Breast Biopsy: 21- Benign breast tissue with sclerosing intraductal papilloma with apocrine metaplasia and usual duct hyperplasia (UDH) and associated duct ectasia. Negative for atypia or malignancy   Bilateral diagnostic Mammogram: 2022- negative      Personal history of cancer: no  Prior chest wall radiation at ages 10-30: no  Genetic testing: none  Ashkenazi inheritance: no  OB/Gyn history:    Number of pregnancies & age at first gestation:  ; First live birth at 22 y/o   Age of menarche:12 y/o  Age of menopause: currently in menopause   Body mass index is 22.3 kg/m².   HRT Use: none   Breastfeeding: yes  Number of previous biopsies: 2017- Right breast- benign per pt; 2021 Left breast-  Benign breast tissue with sclerosing intraductal papilloma with apocrine metaplasia and usual duct hyperplasia (UDH) and associated duct ectasia. Negative for atypia or malignancy   Breast density: Heterogeneously dense    Family History:  Mother- Unilateral breast cancer at 32 (still living at 65)  Sister- Breast cancer at 24 (still living at 39)  PGM- Breast cancer at 63 ( at 65)  Father- Melanoma   MGM- Colon cancer( at 87)  PGF- Lung cancer  MGF- Lung cancer     Shelly Model 5 Year Risk: 4.5% (average for a female her age is 1.0%)    Past Medical   Past Medical  History:   Diagnosis Date    Basal cell carcinoma     Cancer     skin cancer , basal cell CA     Cancer     cervical cancer    Fibrocystic breast     Melanoma 1990's     Patient Active Problem List   Diagnosis    History of cervical cancer    Skin cancer    BMI 28.0-28.9,adult    Smoking addiction    Abnormal uterine bleeding    Pain of right lower extremity    Mixed hyperlipidemia    Current mild episode of major depressive disorder without prior episode       Social History   Social History     Tobacco Use    Smoking status: Every Day     Current packs/day: 0.40     Types: Cigarettes    Smokeless tobacco: Never    Tobacco comments:     pt on chantix   Substance Use Topics    Alcohol use: Yes    Drug use: No       Family History  Family History   Problem Relation Age of Onset    Cancer Mother         breast     Breast cancer Mother     Cancer Father         melanoma    Melanoma Father     Cancer Paternal Grandmother     Breast cancer Paternal Grandmother     Cancer Paternal Grandfather     Cancer Sister         breast    Breast cancer Sister     Colon cancer Maternal Grandmother     Ovarian cancer Neg Hx        Medications    Current Outpatient Medications:     clobetasoL (TEMOVATE) 0.05 % cream, Apply topically 2 (two) times daily. Prn itch.  Stop using steroid topical when skin is smooth and non itchy.  Do not treat dark or red coloring., Disp: 45 g, Rfl: 0    ergocalciferol (ERGOCALCIFEROL) 50,000 unit Cap, Take 50,000 Units by mouth every 7 days., Disp: , Rfl:     hydrocortisone (ANUSOL-HC) 2.5 % rectal cream, Place rectally 2 (two) times daily., Disp: 28 g, Rfl: 2    ketorolac (TORADOL) 10 mg tablet, Take 1 tablet (10 mg total) by mouth every 6 (six) hours as needed for Pain., Disp: 30 tablet, Rfl: 0    medroxyPROGESTERone (DEPO-PROVERA) 150 mg/mL Syrg, Inject 1 mL (150 mg total) into the muscle every 3 (three) months., Disp: 1 Syringe, Rfl: 3    nicotine (NICODERM CQ) 21 mg/24 hr, Place 1 patch onto the skin  "once daily., Disp: 28 patch, Rfl: 0    ondansetron (ZOFRAN-ODT) 4 MG TbDL, Take 1 tablet (4 mg total) by mouth 3 (three) times daily., Disp: 10 tablet, Rfl: 0    salicylic acid (SELSUN BLUE, SALICYLIC ACID,) 3 % Sham, Apply 1 application topically daily as needed., Disp: 120 mL, Rfl: 3    simvastatin (ZOCOR) 10 MG tablet, Take 1 tablet (10 mg total) by mouth every evening., Disp: 90 tablet, Rfl: 0    traZODone (DESYREL) 50 MG tablet, Take 2 tablets (100 mg total) by mouth every evening., Disp: 180 tablet, Rfl: 2    varenicline (CHANTIX STARTING MONTH BOX) 0.5 mg (11)- 1 mg (42) tablet, Take one 0.5mg tab by mouth once daily X3 days,then increase to one 0.5mg tab twice daily X4 days,then increase to one 1mg tab twice daily, Disp: 1 each, Rfl: 0    Current Facility-Administered Medications:     medroxyPROGESTERone (DEPO-PROVERA) injection 150 mg, 150 mg, Intramuscular, Q90 Days, Elio Iglesias MD, 150 mg at 11/18/21 1413    Allergies  Review of patient's allergies indicates:   Allergen Reactions    Adhesive        Review of Systems  General: No fever, chills, or weight loss.  Chest: No chest pain, shortness of breath, or palpitations.  Breast: No pain, masses, or nipple discharge.  Vulva: No pain, lesions, or itching.  Vagina: No relaxation, itching, discharge, or lesions.  Abdomen: No pain, nausea, vomiting, diarrhea, or constipation.  Urinary: No incontinence, nocturia, frequency, or dysuria.  Extremities:  No leg cramps, edema, or calf pain.  Neurologic: No headaches, dizziness, or visual changes.    Objective:     Vitals:    10/09/23 1036   BP: (!) 91/58   BP Location: Right arm   Patient Position: Sitting   BP Method: Medium (Automatic)   Pulse: 80   Weight: 53.5 kg (118 lb)   Height: 5' 1" (1.549 m)     Body mass index is 22.3 kg/m².    GENERAL:  Well-developed, well-nourished female in no acute distress. Vital signs reviewed.   SKIN:  Warm, dry without rashes, purpura or petechiae.  EYES:  EOMs intact.  " Conjunctivae normal.  HEAD:  Normocephalic.  EARS/NOSE/MOUTH/THROAT:  Hearing intact.   NECK:  Supple with good range of motion; no masses  PSYCHIATRIC:  Normal affect and mood.  Alert and Oriented x 3.   BREASTS:Symmetrical, no skin changes or visible lesions.  No palpable masses, nipple discharge bilaterally.      Assessment:     Increased risk of breast cancer: This is a 43 y.o. female who presents for follow up of increased risk of breast cancer based on an elevated Tyrer Cuzick score.      Plan:   Continue annual screening mammograms and breast MRI, alternating imaging every 6 months until age 73.   Schedule breast MRI now  Will plan for bilateral screening mammogram in 2/2024  Two physical exams per year, one can be with her OB/GYN.      Risk reduction options with chemoprevention such as Tamoxifen or Raloxifene were discussed.  These have been shown to lower the risk of breast cancer incidence, however there is no survival benefit in patients who don't have breast cancer.  I explained the most common side effects and risks including hot flashes, thromboembolism, stroke, endometrial cancer, weight changes, and pain. She does smoke which increases risk of thrombosis. If interested in the future, will schedule with medical oncology.    Follow up in 1 year for CBE.     I spent a total of 25 minutes on the day of the visit.This includes face to face time and non-face to face time preparing to see the patient (eg, review of tests), obtaining and/or reviewing separately obtained history, documenting clinical information in the electronic or other health record, independently interpreting results and communicating results to the patient/family/caregiver, or care coordinator.

## 2023-11-20 ENCOUNTER — HOSPITAL ENCOUNTER (OUTPATIENT)
Dept: RADIOLOGY | Facility: HOSPITAL | Age: 43
Discharge: HOME | End: 2023-11-20
Attending: PHYSICIAN ASSISTANT
Payer: COMMERCIAL

## 2023-11-20 VITALS — WEIGHT: 128 LBS | BODY MASS INDEX: 24.19 KG/M2

## 2023-11-20 DIAGNOSIS — Z12.31 SCREENING MAMMOGRAM, ENCOUNTER FOR: ICD-10-CM

## 2023-11-20 PROCEDURE — 77067 MAMMO DIGITAL SCREENING BILAT WITH TOMO: ICD-10-PCS | Mod: 26,,, | Performed by: RADIOLOGY

## 2023-11-20 PROCEDURE — 77067 SCR MAMMO BI INCL CAD: CPT | Mod: TC

## 2023-11-20 PROCEDURE — 77067 SCR MAMMO BI INCL CAD: CPT | Mod: 26,,, | Performed by: RADIOLOGY

## 2023-11-20 PROCEDURE — 77063 MAMMO DIGITAL SCREENING BILAT WITH TOMO: ICD-10-PCS | Mod: 26,,, | Performed by: RADIOLOGY

## 2023-11-20 PROCEDURE — 77063 BREAST TOMOSYNTHESIS BI: CPT | Mod: 26,,, | Performed by: RADIOLOGY

## 2024-02-01 ENCOUNTER — CLINICAL SUPPORT (OUTPATIENT)
Dept: SMOKING CESSATION | Facility: CLINIC | Age: 44
End: 2024-02-01
Payer: COMMERCIAL

## 2024-02-01 DIAGNOSIS — F17.200 NICOTINE DEPENDENCE: Primary | ICD-10-CM

## 2024-02-01 PROCEDURE — 99404 PREV MED CNSL INDIV APPRX 60: CPT | Mod: S$GLB,,,

## 2024-02-01 RX ORDER — IBUPROFEN 200 MG
1 TABLET ORAL DAILY
Qty: 28 PATCH | Refills: 0 | Status: SHIPPED | OUTPATIENT
Start: 2024-02-01

## 2024-02-01 RX ORDER — VARENICLINE TARTRATE 1 MG/1
1 TABLET, FILM COATED ORAL 2 TIMES DAILY
Qty: 56 TABLET | Refills: 0 | Status: SHIPPED | OUTPATIENT
Start: 2024-02-01

## 2024-02-01 NOTE — PROGRESS NOTES
See Tobacco Cessation Intake Form for patient assessment and recommendations.  Exhaled carbon monoxide level was 8 ppm per [employer].

## 2024-02-01 NOTE — Clinical Note
Pt seen at intake today. She currently smokes 10 cigs/day. Discussed tobacco cessation medication of chantix 1 mg QAM to start and 21 mg nicotine patch QD. Pt started on rate reduction and wait time of 15 min prior to smoking. Exhaled carbon monoxide level was 8 (0-6 non-smoker). Will see pt back in office in 1 wk. 
PAST SURGICAL HISTORY:  No significant past surgical history

## 2024-02-08 ENCOUNTER — CLINICAL SUPPORT (OUTPATIENT)
Dept: SMOKING CESSATION | Facility: CLINIC | Age: 44
End: 2024-02-08
Payer: COMMERCIAL

## 2024-02-08 DIAGNOSIS — F17.200 NICOTINE DEPENDENCE: Primary | ICD-10-CM

## 2024-02-08 PROCEDURE — 99402 PREV MED CNSL INDIV APPRX 30: CPT | Mod: S$GLB,,,

## 2024-02-08 NOTE — PROGRESS NOTES
Individual Follow-Up Form    2/8/2024    Clinical Status of Patient: Outpatient    Length of Service: 30 minutes    Continuing Medication: yes  Chantix or Patches    Other Medications: none     Target Symptoms: Withdrawal and medication side effects. The following were rated moderate (3) to severe (4) on TCRS:  Moderate (3): desire/crave tobacco  Severe (4): none    Comments:  Pt seen in office today. She continues to smoke 10 cigs/day. Pt remains on tobacco cessation medication of chantix starter 1 mg QAM and 21 mg nicotine patch QD. No adverse effects/mental changes noted at this time. Pt asked to reduce current smoking rate by 3 cigs/day. Reviewed coping strategies/stress management/habitual behavior/relapse prevention with patient. Exhaled carbon monoxide level was 13 ppm per Smokerlyzer (0-6 non-smoker). Will see pt back in office in 1 wk.     Diagnosis: F17.200    Next Visit: 1 week

## 2024-02-08 NOTE — Clinical Note
Pt seen in office today. She continues to smoke 10 cigs/day. Pt remains on tobacco cessation medication of chantix starter 1 mg QAM and 21 mg nicotine patch QD. No adverse effects/mental changes noted at this time. Pt asked to reduce current smoking rate by 3 cigs/day. Reviewed coping strategies/stress management/habitual behavior/relapse prevention with patient. Exhaled carbon monoxide level was 13 ppm per Smokerlyzer (0-6 non-smoker). Will see pt back in office in 1 wk.

## 2024-02-16 ENCOUNTER — OFFICE VISIT (OUTPATIENT)
Dept: OBSTETRICS AND GYNECOLOGY | Facility: CLINIC | Age: 44
End: 2024-02-16
Payer: COMMERCIAL

## 2024-02-16 VITALS
WEIGHT: 125.13 LBS | HEIGHT: 61 IN | BODY MASS INDEX: 23.63 KG/M2 | SYSTOLIC BLOOD PRESSURE: 97 MMHG | HEART RATE: 80 BPM | DIASTOLIC BLOOD PRESSURE: 62 MMHG

## 2024-02-16 DIAGNOSIS — Z01.419 ENCOUNTER FOR WELL WOMAN EXAM WITH ROUTINE GYNECOLOGICAL EXAM: Primary | ICD-10-CM

## 2024-02-16 DIAGNOSIS — N91.1 SECONDARY AMENORRHEA: ICD-10-CM

## 2024-02-16 PROCEDURE — 3008F BODY MASS INDEX DOCD: CPT | Mod: CPTII,S$GLB,, | Performed by: NURSE PRACTITIONER

## 2024-02-16 PROCEDURE — 3074F SYST BP LT 130 MM HG: CPT | Mod: CPTII,S$GLB,, | Performed by: NURSE PRACTITIONER

## 2024-02-16 PROCEDURE — 99396 PREV VISIT EST AGE 40-64: CPT | Mod: S$GLB,,, | Performed by: NURSE PRACTITIONER

## 2024-02-16 PROCEDURE — 3044F HG A1C LEVEL LT 7.0%: CPT | Mod: CPTII,S$GLB,, | Performed by: NURSE PRACTITIONER

## 2024-02-16 PROCEDURE — 87624 HPV HI-RISK TYP POOLED RSLT: CPT | Performed by: NURSE PRACTITIONER

## 2024-02-16 PROCEDURE — 88175 CYTOPATH C/V AUTO FLUID REDO: CPT | Performed by: NURSE PRACTITIONER

## 2024-02-16 PROCEDURE — 3078F DIAST BP <80 MM HG: CPT | Mod: CPTII,S$GLB,, | Performed by: NURSE PRACTITIONER

## 2024-02-16 PROCEDURE — 99999 PR PBB SHADOW E&M-EST. PATIENT-LVL III: CPT | Mod: PBBFAC,,, | Performed by: NURSE PRACTITIONER

## 2024-02-16 NOTE — PROGRESS NOTES
"Chief Complaint: Well Woman Exam     HPI:      Tita Campos is a 43 y.o.  who presents today for well woman exam.  LMP: 6/10/2023. Patient is not currently sexually active. She is currently using abstinence for contraception. She declines STD screening today. Ms. Campos confirms that she is safe at home.  Ms. Campos denies abnormal vaginal bleeding, discharge, pelvic pain, urinary problems, or changes in appetite.  Menses: last menses was 2023 and 10/2022 before that. Will check FSH    Previous NILM/ HPV neg (2022)  Pap:NILM/ HPV negative ()    ASCUS with POSITIVE high risk HPV in 2019   colpo 2019 negative for lesions of malignancy.    h/o LEEP in     Previous Mammogram: BiRads: 1 T-C Score: 23.73% (2023)   Cologuard: negative (2023)    Gardasil:has never had     Family History   Problem Relation Age of Onset    Cancer Mother         breast     Breast cancer Mother     Cancer Father         melanoma    Melanoma Father     Cancer Paternal Grandmother     Breast cancer Paternal Grandmother     Cancer Paternal Grandfather     Cancer Sister         breast    Breast cancer Sister     Colon cancer Maternal Grandmother     Ovarian cancer Neg Hx      OB History          6    Para   3    Term   3            AB   3    Living             SAB   3    IAB        Ectopic        Multiple        Live Births                     ROS:     GENERAL: Denies unintentional weight gain or weight loss. Feeling well overall.   BREASTS: Denies pain, lumps, or nipple discharge.   ABDOMEN: Denies abdominal pain, constipation, diarrhea, nausea, vomiting, change in appetite.  URINARY: Denies frequency, dysuria, hematuria.  PSYCHIATRIC: Denies depression, anxiety or mood swings.    Physical Exam:      PHYSICAL EXAM:  BP 97/62   Pulse 80   Ht 5' 1" (1.549 m)   Wt 56.8 kg (125 lb 1.8 oz)   LMP 2022 (Approximate)   BMI 23.64 kg/m²   Body mass index is 23.64 kg/m².     APPEARANCE: Well " nourished, well developed, in no acute distress.  PSYCH: Appropriate mood and affect.  ABDOMEN: Soft.  No tenderness or masses.    BREASTS: Symmetrical, no skin changes or visible lesions.  No palpable masses or nipple discharge bilaterally.  PELVIC: Normal external genitalia without lesions.  Normal hair distribution.  Adequate perineal body, normal urethral meatus.  Vagina moist , mildly atrophic without lesions or discharge.  Cervix pink, without lesions, discharge or tenderness.  No significant cystocele or rectocele.  Bimanual exam shows uterus to be normal size, regular, mobile and nontender.  Adnexa without masses or tenderness.      Assessment/Plan:     Encounter for well woman exam with routine gynecological exam  -     Liquid-Based Pap Smear, Screening  -     HPV High Risk Genotypes, PCR    Secondary amenorrhea  -     Follicle Stimulating Hormone; Future; Expected date: 02/16/2024  -     Estradiol; Future; Expected date: 02/16/2024  -     Prolactin; Future; Expected date: 02/16/2024      Counseling:     Patient was counseled today on current ASCCP pap guidelines, the recommendation for yearly pelvic exams, healthy diet and exercise routines, breast self awareness and annual mammograms.She is to see her PCP for other health maintenance.     Check FSH and estradiol

## 2024-02-21 ENCOUNTER — CLINICAL SUPPORT (OUTPATIENT)
Dept: SMOKING CESSATION | Facility: CLINIC | Age: 44
End: 2024-02-21
Payer: COMMERCIAL

## 2024-02-21 DIAGNOSIS — F17.200 NICOTINE DEPENDENCE: Primary | ICD-10-CM

## 2024-02-21 LAB
FINAL PATHOLOGIC DIAGNOSIS: NORMAL
Lab: NORMAL

## 2024-02-21 PROCEDURE — 99403 PREV MED CNSL INDIV APPRX 45: CPT | Mod: S$GLB,,,

## 2024-02-21 NOTE — PROGRESS NOTES
Individual Follow-Up Form    2/21/2024    Clinical Status of Patient: Outpatient    Length of Service: 30 minutes    Continuing Medication: yes  Chantix or Patches    Other Medications: none     Target Symptoms: Withdrawal and medication side effects. The following were rated moderate (3) to severe (4) on TCRS:  Moderate (3): desire/crave tobacco  Severe (4): none    Comments:  Pt seen in office today. She continues to smoke 10 cigs/day. Pt remains on tobacco cessation medication of chantix 1 mg BID and 21 mg nicotine patch QD. No adverse effects/mental changes noted at this time. Pt asked to reduce current smoking rate by 3 cigs/day. Reviewed coping strategies/stress management/habitual behavior/relapse prevention with patient. Exhaled carbon monoxide level was 16 ppm per Smokerlyzer (0-6 non-smoker). Will see pt back in office in 1 wk.     Diagnosis: F17.200    Next Visit: 1 week

## 2024-02-21 NOTE — Clinical Note
Pt seen in office today. She continues to smoke 10 cigs/day. Pt remains on tobacco cessation medication of chantix 1 mg BID and 21 mg nicotine patch QD. No adverse effects/mental changes noted at this time. Pt asked to reduce current smoking rate by 3 cigs/day. Reviewed coping strategies/stress management/habitual behavior/relapse prevention with patient. Exhaled carbon monoxide level was 16 ppm per Smokerlyzer (0-6 non-smoker). Will see pt back in office in 1 wk.

## 2024-03-13 ENCOUNTER — DOCUMENTATION ONLY (OUTPATIENT)
Dept: SMOKING CESSATION | Facility: CLINIC | Age: 44
End: 2024-03-13
Payer: COMMERCIAL

## 2024-03-13 NOTE — PROGRESS NOTES
Patient called, she cannot make visit. Has to take daughter to ED. Will see her same time and day next week.

## 2024-05-06 ENCOUNTER — TELEPHONE (OUTPATIENT)
Dept: SMOKING CESSATION | Facility: CLINIC | Age: 44
End: 2024-05-06
Payer: COMMERCIAL

## 2024-08-01 ENCOUNTER — TELEPHONE (OUTPATIENT)
Dept: SMOKING CESSATION | Facility: CLINIC | Age: 44
End: 2024-08-01
Payer: COMMERCIAL

## 2024-08-01 NOTE — TELEPHONE ENCOUNTER
1st attempt, patient called in regards to 6 month f/u for quit 7 with Smoking Cessation.  Patient answered the phone and then hung up on me after I stated my name and reason for calling.

## 2024-08-20 ENCOUNTER — PATIENT MESSAGE (OUTPATIENT)
Dept: DERMATOLOGY | Facility: CLINIC | Age: 44
End: 2024-08-20
Payer: COMMERCIAL

## 2024-11-25 ENCOUNTER — HOSPITAL ENCOUNTER (OUTPATIENT)
Dept: RADIOLOGY | Facility: HOSPITAL | Age: 44
Discharge: HOME | End: 2024-11-25
Attending: PHYSICAL MEDICINE & REHABILITATION
Payer: COMMERCIAL

## 2024-11-25 DIAGNOSIS — Z12.31 ENCOUNTER FOR SCREENING MAMMOGRAM FOR BREAST CANCER: ICD-10-CM

## 2024-11-25 PROCEDURE — 77067 SCR MAMMO BI INCL CAD: CPT | Mod: TC

## 2025-02-05 ENCOUNTER — TELEPHONE (OUTPATIENT)
Dept: SMOKING CESSATION | Facility: CLINIC | Age: 45
End: 2025-02-05
Payer: COMMERCIAL

## 2025-02-05 NOTE — TELEPHONE ENCOUNTER
Called patient about 12 month follow up. Left message for patient to call [phone]. Resolved quit episode.

## 2025-06-20 ENCOUNTER — TELEPHONE (OUTPATIENT)
Dept: DERMATOLOGY | Facility: CLINIC | Age: 45
End: 2025-06-20
Payer: COMMERCIAL

## 2025-06-20 PROBLEM — G89.29 CHRONIC CERVICAL PAIN: Status: ACTIVE | Noted: 2025-06-20

## 2025-06-20 PROBLEM — M54.2 CHRONIC CERVICAL PAIN: Status: ACTIVE | Noted: 2025-06-20

## 2025-06-20 PROBLEM — F32.0 CURRENT MILD EPISODE OF MAJOR DEPRESSIVE DISORDER WITHOUT PRIOR EPISODE: Status: RESOLVED | Noted: 2021-07-12 | Resolved: 2025-06-20

## 2025-06-20 NOTE — TELEPHONE ENCOUNTER
Called and got patient scheduled for an appointment    ----- Message from JARRED Mccloud sent at 6/20/2025 10:12 AM CDT -----  Regarding: FW: Re:Skin check for Skin Cancer  Contact: [phone]  FYI  ----- Message -----  From: Mariela Ivey  Sent: 6/20/2025   9:31 AM CDT  To: [facility] Support Staff  Subject: Re:Skin check for Skin Cancer                    Good morning,     I  received a message from the [facility]: (5/31/25)    · req_provider: Dermatology  · reason_visit: Skin check for skin cancer  · prefdates: Any afternoon beginning Jun 13th  · onvisit: I have had skin cancer in the past. I'm overdue for a skin check.    Pt had a scheduled appointment on 3/3/25 @ 1:15 pm, and ir shows No Show in Pt chart.    I called to follow-up with Pt this morning, and she shared the clinical staff canceled her appointment.    Pt is requesting a call back from your staff, in hopes to be scheduled - VIC    Thank you,   Kaykay LOPEZ  Access Navigator

## 2025-07-08 ENCOUNTER — OFFICE VISIT (OUTPATIENT)
Dept: DERMATOLOGY | Facility: CLINIC | Age: 45
End: 2025-07-08
Payer: COMMERCIAL

## 2025-07-08 DIAGNOSIS — L81.4 LENTIGO: ICD-10-CM

## 2025-07-08 DIAGNOSIS — Z85.828 HISTORY OF NONMELANOMA SKIN CANCER: ICD-10-CM

## 2025-07-08 DIAGNOSIS — L90.5 SCAR: ICD-10-CM

## 2025-07-08 DIAGNOSIS — Z85.820 HISTORY OF MELANOMA: ICD-10-CM

## 2025-07-08 DIAGNOSIS — L82.1 SEBORRHEIC KERATOSIS: ICD-10-CM

## 2025-07-08 DIAGNOSIS — D18.01 CHERRY ANGIOMA: ICD-10-CM

## 2025-07-08 DIAGNOSIS — D22.9 MULTIPLE BENIGN NEVI: Primary | ICD-10-CM

## 2025-07-08 PROCEDURE — 1159F MED LIST DOCD IN RCRD: CPT | Mod: CPTII,S$GLB,, | Performed by: STUDENT IN AN ORGANIZED HEALTH CARE EDUCATION/TRAINING PROGRAM

## 2025-07-08 PROCEDURE — 1160F RVW MEDS BY RX/DR IN RCRD: CPT | Mod: CPTII,S$GLB,, | Performed by: STUDENT IN AN ORGANIZED HEALTH CARE EDUCATION/TRAINING PROGRAM

## 2025-07-08 PROCEDURE — 99203 OFFICE O/P NEW LOW 30 MIN: CPT | Mod: S$GLB,,, | Performed by: STUDENT IN AN ORGANIZED HEALTH CARE EDUCATION/TRAINING PROGRAM

## 2025-07-08 PROCEDURE — 3044F HG A1C LEVEL LT 7.0%: CPT | Mod: CPTII,S$GLB,, | Performed by: STUDENT IN AN ORGANIZED HEALTH CARE EDUCATION/TRAINING PROGRAM

## 2025-07-08 NOTE — PROGRESS NOTES
Subjective:      Patient ID:  Tita Campos is a 45 y.o. female who presents for   Chief Complaint   Patient presents with    Skin Cancer     Hx of MM, BCC     New patient     Patient here today for skin check TBSE   Complains of a few spots, some are itchy.    Derm Hx:  MM, back, required chemo and radiation  Multiple BCC    Hx of cervical and breast cancer     Father  from MM    Previous derm - Dr. Watson        Review of Systems   Constitutional:  Negative for fever, chills and fatigue.   Respiratory:  Negative for cough and shortness of breath.    Gastrointestinal:  Negative for nausea and vomiting.   Skin:  Positive for activity-related sunscreen use and wears hat. Negative for daily sunscreen use.   Hematologic/Lymphatic: Does not bruise/bleed easily.       Objective:   Physical Exam   Constitutional: She appears well-developed and well-nourished. No distress.   Neurological: She is alert and oriented to person, place, and time. She is not disoriented.   Psychiatric: She has a normal mood and affect.   Skin:   Areas Examined (abnormalities noted in diagram):   Scalp / Hair Palpated and Inspected  Head / Face Inspection Performed  Neck Inspection Performed  Chest / Axilla Inspection Performed  Abdomen Inspection Performed  Genitals / Buttocks / Groin Inspection Performed  Back Inspection Performed  RUE Inspected  LUE Inspection Performed  RLE Inspected  LLE Inspection Performed  Nails and Digits Inspection Performed                 Diagram Legend     Erythematous scaling macule/papule c/w actinic keratosis       Vascular papule c/w angioma      Pigmented verrucoid papule/plaque c/w seborrheic keratosis      Yellow umbilicated papule c/w sebaceous hyperplasia      Irregularly shaped tan macule c/w lentigo     1-2 mm smooth white papules consistent with Milia      Movable subcutaneous cyst with punctum c/w epidermal inclusion cyst      Subcutaneous movable cyst c/w pilar cyst      Firm pink to  brown papule c/w dermatofibroma      Pedunculated fleshy papule(s) c/w skin tag(s)      Evenly pigmented macule c/w junctional nevus     Mildly variegated pigmented, slightly irregular-bordered macule c/w mildly atypical nevus      Flesh colored to evenly pigmented papule c/w intradermal nevus       Pink pearly papule/plaque c/w basal cell carcinoma      Erythematous hyperkeratotic cursted plaque c/w SCC      Surgical scar with no sign of skin cancer recurrence      Open and closed comedones      Inflammatory papules and pustules      Verrucoid papule consistent consistent with wart     Erythematous eczematous patches and plaques     Dystrophic onycholytic nail with subungual debris c/w onychomycosis     Umbilicated papule    Erythematous-base heme-crusted tan verrucoid plaque consistent with inflamed seborrheic keratosis     Erythematous Silvery Scaling Plaque c/w Psoriasis     See annotation      Assessment / Plan:        Multiple benign nevi  Careful dermoscopy evaluation of nevi performed with none identified as needing biopsy today  Monitor for new mole or moles that are becoming bigger, darker, irritated, or developing irregular borders.     Cherry angioma  This is a benign vascular lesion. Reassurance given. No treatment required.     Seborrheic keratosis  These are benign inherited growths without a malignant potential. Reassurance given to patient. No treatment is necessary.     Lentigo  This is a benign hyperpigmented sun induced lesion. Daily sun protection will reduce the number of new lesions. Treatment of these benign lesions are considered cosmetic.    History of nonmelanoma skin cancer  History of melanoma  Scar  Area of previous melanoma, NMSC examined. Site well healed with no signs of recurrence.  Total body skin examination performed today including at least 12 points as noted in physical examination. No lesions suspicious for malignancy noted.  Patient instructed in importance in daily broad  spectrum sun protection of at least spf 30. Mineral sunscreen ingredients preferred (Zinc +/- Titanium) and can be found OTC.   Patient encouraged to wear hat for all outdoor exposure.   Also discussed sun avoidance and use of protective clothing.       1 year    No follow-ups on file.

## 2025-07-18 ENCOUNTER — HOSPITAL ENCOUNTER (OUTPATIENT)
Dept: RADIOLOGY | Facility: HOSPITAL | Age: 45
Discharge: HOME | End: 2025-07-18
Attending: NURSE PRACTITIONER
Payer: COMMERCIAL

## 2025-07-18 DIAGNOSIS — Z12.31 ENCOUNTER FOR SCREENING MAMMOGRAM FOR MALIGNANT NEOPLASM OF BREAST: ICD-10-CM
